# Patient Record
Sex: FEMALE | Race: BLACK OR AFRICAN AMERICAN | NOT HISPANIC OR LATINO | URBAN - METROPOLITAN AREA
[De-identification: names, ages, dates, MRNs, and addresses within clinical notes are randomized per-mention and may not be internally consistent; named-entity substitution may affect disease eponyms.]

---

## 2017-05-07 ENCOUNTER — INPATIENT (INPATIENT)
Facility: HOSPITAL | Age: 82
LOS: 0 days | Discharge: ANOTHER HOSPITAL | End: 2017-05-07
Attending: HOSPITALIST | Admitting: HOSPITALIST
Payer: MEDICARE

## 2017-05-07 VITALS
TEMPERATURE: 99 F | RESPIRATION RATE: 18 BRPM | DIASTOLIC BLOOD PRESSURE: 82 MMHG | SYSTOLIC BLOOD PRESSURE: 184 MMHG | OXYGEN SATURATION: 86 % | HEART RATE: 85 BPM

## 2017-05-07 DIAGNOSIS — Z96.652 PRESENCE OF LEFT ARTIFICIAL KNEE JOINT: Chronic | ICD-10-CM

## 2017-05-07 DIAGNOSIS — N17.9 ACUTE KIDNEY FAILURE, UNSPECIFIED: ICD-10-CM

## 2017-05-07 DIAGNOSIS — Z96.651 PRESENCE OF RIGHT ARTIFICIAL KNEE JOINT: Chronic | ICD-10-CM

## 2017-05-07 DIAGNOSIS — S72.92XA UNSPECIFIED FRACTURE OF LEFT FEMUR, INITIAL ENCOUNTER FOR CLOSED FRACTURE: ICD-10-CM

## 2017-05-07 DIAGNOSIS — F03.90 UNSPECIFIED DEMENTIA, UNSPECIFIED SEVERITY, WITHOUT BEHAVIORAL DISTURBANCE, PSYCHOTIC DISTURBANCE, MOOD DISTURBANCE, AND ANXIETY: ICD-10-CM

## 2017-05-07 DIAGNOSIS — Z29.9 ENCOUNTER FOR PROPHYLACTIC MEASURES, UNSPECIFIED: ICD-10-CM

## 2017-05-07 DIAGNOSIS — C50.919 MALIGNANT NEOPLASM OF UNSPECIFIED SITE OF UNSPECIFIED FEMALE BREAST: ICD-10-CM

## 2017-05-07 DIAGNOSIS — D64.9 ANEMIA, UNSPECIFIED: ICD-10-CM

## 2017-05-07 DIAGNOSIS — S72.442A DISPLACED FRACTURE OF LOWER EPIPHYSIS (SEPARATION) OF LEFT FEMUR, INITIAL ENCOUNTER FOR CLOSED FRACTURE: ICD-10-CM

## 2017-05-07 DIAGNOSIS — I10 ESSENTIAL (PRIMARY) HYPERTENSION: ICD-10-CM

## 2017-05-07 DIAGNOSIS — Z79.899 OTHER LONG TERM (CURRENT) DRUG THERAPY: ICD-10-CM

## 2017-05-07 DIAGNOSIS — E87.5 HYPERKALEMIA: ICD-10-CM
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
## 2017-05-07 LAB
ALBUMIN SERPL ELPH-MCNC: 3.9 G/DL — SIGNIFICANT CHANGE UP (ref 3.3–5)
ALP SERPL-CCNC: 52 U/L — SIGNIFICANT CHANGE UP (ref 40–120)
ALT FLD-CCNC: 14 U/L — SIGNIFICANT CHANGE UP (ref 4–33)
APPEARANCE UR: CLEAR — SIGNIFICANT CHANGE UP
AST SERPL-CCNC: 29 U/L — SIGNIFICANT CHANGE UP (ref 4–32)
BASE EXCESS BLDV CALC-SCNC: 4.6 MMOL/L — SIGNIFICANT CHANGE UP
BASOPHILS # BLD AUTO: 0.03 K/UL — SIGNIFICANT CHANGE UP (ref 0–0.2)
BASOPHILS NFR BLD AUTO: 0.3 % — SIGNIFICANT CHANGE UP (ref 0–2)
BILIRUB SERPL-MCNC: 0.4 MG/DL — SIGNIFICANT CHANGE UP (ref 0.2–1.2)
BILIRUB UR-MCNC: NEGATIVE — SIGNIFICANT CHANGE UP
BLOOD GAS VENOUS - CREATININE: 2.05 MG/DL — HIGH (ref 0.5–1.3)
BLOOD UR QL VISUAL: NEGATIVE — SIGNIFICANT CHANGE UP
BUN SERPL-MCNC: 34 MG/DL — HIGH (ref 7–23)
CALCIUM SERPL-MCNC: 10.1 MG/DL — SIGNIFICANT CHANGE UP (ref 8.4–10.5)
CHLORIDE BLDV-SCNC: 108 MMOL/L — SIGNIFICANT CHANGE UP (ref 96–108)
CHLORIDE SERPL-SCNC: 106 MMOL/L — SIGNIFICANT CHANGE UP (ref 98–107)
CK MB BLD-MCNC: 1.52 NG/ML — SIGNIFICANT CHANGE UP (ref 1–4.7)
CK MB BLD-MCNC: SIGNIFICANT CHANGE UP (ref 0–2.5)
CK SERPL-CCNC: 91 U/L — SIGNIFICANT CHANGE UP (ref 25–170)
CO2 SERPL-SCNC: 25 MMOL/L — SIGNIFICANT CHANGE UP (ref 22–31)
COLOR SPEC: YELLOW — SIGNIFICANT CHANGE UP
CREAT SERPL-MCNC: 2.02 MG/DL — HIGH (ref 0.5–1.3)
EOSINOPHIL # BLD AUTO: 0.1 K/UL — SIGNIFICANT CHANGE UP (ref 0–0.5)
EOSINOPHIL NFR BLD AUTO: 1.1 % — SIGNIFICANT CHANGE UP (ref 0–6)
GAS PNL BLDV: 137 MMOL/L — SIGNIFICANT CHANGE UP (ref 136–146)
GLUCOSE BLDV-MCNC: 84 — SIGNIFICANT CHANGE UP (ref 70–99)
GLUCOSE SERPL-MCNC: 97 MG/DL — SIGNIFICANT CHANGE UP (ref 70–99)
GLUCOSE UR-MCNC: NEGATIVE — SIGNIFICANT CHANGE UP
HCO3 BLDV-SCNC: 27 MMOL/L — SIGNIFICANT CHANGE UP (ref 20–27)
HCT VFR BLD CALC: 35.6 % — SIGNIFICANT CHANGE UP (ref 34.5–45)
HCT VFR BLDV CALC: 33.1 % — LOW (ref 34.5–45)
HGB BLD-MCNC: 10.8 G/DL — LOW (ref 11.5–15.5)
HGB BLDV-MCNC: 10.7 G/DL — LOW (ref 11.5–15.5)
IMM GRANULOCYTES NFR BLD AUTO: 0.3 % — SIGNIFICANT CHANGE UP (ref 0–1.5)
KETONES UR-MCNC: NEGATIVE — SIGNIFICANT CHANGE UP
LACTATE BLDV-MCNC: 3.5 MMOL/L — HIGH (ref 0.5–2)
LEUKOCYTE ESTERASE UR-ACNC: NEGATIVE — SIGNIFICANT CHANGE UP
LYMPHOCYTES # BLD AUTO: 1.65 K/UL — SIGNIFICANT CHANGE UP (ref 1–3.3)
LYMPHOCYTES # BLD AUTO: 17.7 % — SIGNIFICANT CHANGE UP (ref 13–44)
MCHC RBC-ENTMCNC: 30.3 % — LOW (ref 32–36)
MCHC RBC-ENTMCNC: 30.8 PG — SIGNIFICANT CHANGE UP (ref 27–34)
MCV RBC AUTO: 101.4 FL — HIGH (ref 80–100)
MONOCYTES # BLD AUTO: 0.46 K/UL — SIGNIFICANT CHANGE UP (ref 0–0.9)
MONOCYTES NFR BLD AUTO: 4.9 % — SIGNIFICANT CHANGE UP (ref 2–14)
NEUTROPHILS # BLD AUTO: 7.06 K/UL — SIGNIFICANT CHANGE UP (ref 1.8–7.4)
NEUTROPHILS NFR BLD AUTO: 75.7 % — SIGNIFICANT CHANGE UP (ref 43–77)
NITRITE UR-MCNC: NEGATIVE — SIGNIFICANT CHANGE UP
PCO2 BLDV: 59 MMHG — HIGH (ref 41–51)
PH BLDV: 7.33 PH — SIGNIFICANT CHANGE UP (ref 7.32–7.43)
PH UR: 7 — SIGNIFICANT CHANGE UP (ref 4.6–8)
PLATELET # BLD AUTO: 213 K/UL — SIGNIFICANT CHANGE UP (ref 150–400)
PMV BLD: 11.7 FL — SIGNIFICANT CHANGE UP (ref 7–13)
PO2 BLDV: 25 MMHG — LOW (ref 35–40)
POTASSIUM BLDV-SCNC: 5.2 MMOL/L — HIGH (ref 3.4–4.5)
POTASSIUM SERPL-MCNC: 6.3 MMOL/L — CRITICAL HIGH (ref 3.5–5.3)
POTASSIUM SERPL-SCNC: 6.3 MMOL/L — CRITICAL HIGH (ref 3.5–5.3)
PROT SERPL-MCNC: 7.8 G/DL — SIGNIFICANT CHANGE UP (ref 6–8.3)
PROT UR-MCNC: 10 — SIGNIFICANT CHANGE UP
RBC # BLD: 3.51 M/UL — LOW (ref 3.8–5.2)
RBC # FLD: 13.7 % — SIGNIFICANT CHANGE UP (ref 10.3–14.5)
RBC CASTS # UR COMP ASSIST: SIGNIFICANT CHANGE UP (ref 0–?)
SAO2 % BLDV: 37.3 % — LOW (ref 60–85)
SODIUM SERPL-SCNC: 146 MMOL/L — HIGH (ref 135–145)
SP GR SPEC: 1.02 — SIGNIFICANT CHANGE UP (ref 1–1.03)
SQUAMOUS # UR AUTO: SIGNIFICANT CHANGE UP
TROPONIN T SERPL-MCNC: < 0.06 NG/ML — SIGNIFICANT CHANGE UP (ref 0–0.06)
UROBILINOGEN FLD QL: NORMAL E.U. — SIGNIFICANT CHANGE UP (ref 0.1–0.2)
WBC # BLD: 9.33 K/UL — SIGNIFICANT CHANGE UP (ref 3.8–10.5)
WBC # FLD AUTO: 9.33 K/UL — SIGNIFICANT CHANGE UP (ref 3.8–10.5)
WBC UR QL: SIGNIFICANT CHANGE UP (ref 0–?)

## 2017-05-07 PROCEDURE — 73562 X-RAY EXAM OF KNEE 3: CPT | Mod: 26,LT

## 2017-05-07 PROCEDURE — 72125 CT NECK SPINE W/O DYE: CPT | Mod: 26

## 2017-05-07 PROCEDURE — 73552 X-RAY EXAM OF FEMUR 2/>: CPT | Mod: 26,LT

## 2017-05-07 PROCEDURE — 73700 CT LOWER EXTREMITY W/O DYE: CPT | Mod: 26,LT

## 2017-05-07 PROCEDURE — 70450 CT HEAD/BRAIN W/O DYE: CPT | Mod: 26

## 2017-05-07 PROCEDURE — 73503 X-RAY EXAM HIP UNI 4/> VIEWS: CPT | Mod: 26,LT

## 2017-05-07 PROCEDURE — 99223 1ST HOSP IP/OBS HIGH 75: CPT | Mod: GC

## 2017-05-07 PROCEDURE — 76376 3D RENDER W/INTRP POSTPROCES: CPT | Mod: 26

## 2017-05-07 PROCEDURE — 71010: CPT | Mod: 26

## 2017-05-07 RX ORDER — HEPARIN SODIUM 5000 [USP'U]/ML
5000 INJECTION INTRAVENOUS; SUBCUTANEOUS EVERY 8 HOURS
Qty: 0 | Refills: 0 | Status: COMPLETED | OUTPATIENT
Start: 2017-05-07 | End: 2017-05-07

## 2017-05-07 RX ORDER — DEXTROSE 50 % IN WATER 50 %
50 SYRINGE (ML) INTRAVENOUS ONCE
Qty: 0 | Refills: 0 | Status: COMPLETED | OUTPATIENT
Start: 2017-05-07 | End: 2017-05-07

## 2017-05-07 RX ORDER — FEBUXOSTAT 40 MG/1
1 TABLET ORAL
Qty: 0 | Refills: 0 | COMMUNITY

## 2017-05-07 RX ORDER — SODIUM POLYSTYRENE SULFONATE 4.1 MEQ/G
30 POWDER, FOR SUSPENSION ORAL ONCE
Qty: 0 | Refills: 0 | Status: DISCONTINUED | OUTPATIENT
Start: 2017-05-07 | End: 2017-05-07

## 2017-05-07 RX ORDER — HYDROMORPHONE HYDROCHLORIDE 2 MG/ML
1 INJECTION INTRAMUSCULAR; INTRAVENOUS; SUBCUTANEOUS ONCE
Qty: 0 | Refills: 0 | Status: DISCONTINUED | OUTPATIENT
Start: 2017-05-07 | End: 2017-05-07

## 2017-05-07 RX ORDER — KETOROLAC TROMETHAMINE 30 MG/ML
15 SYRINGE (ML) INJECTION ONCE
Qty: 0 | Refills: 0 | Status: DISCONTINUED | OUTPATIENT
Start: 2017-05-07 | End: 2017-05-07

## 2017-05-07 RX ORDER — INSULIN HUMAN 100 [IU]/ML
5 INJECTION, SOLUTION SUBCUTANEOUS ONCE
Qty: 0 | Refills: 0 | Status: COMPLETED | OUTPATIENT
Start: 2017-05-07 | End: 2017-05-07

## 2017-05-07 RX ORDER — CHOLESTYRAMINE 4 G/9G
0 POWDER, FOR SUSPENSION ORAL
Qty: 0 | Refills: 0 | COMMUNITY

## 2017-05-07 RX ORDER — SERTRALINE 25 MG/1
25 TABLET, FILM COATED ORAL DAILY
Qty: 0 | Refills: 0 | Status: DISCONTINUED | OUTPATIENT
Start: 2017-05-07 | End: 2017-05-07

## 2017-05-07 RX ORDER — HYDROMORPHONE HYDROCHLORIDE 2 MG/ML
0.5 INJECTION INTRAMUSCULAR; INTRAVENOUS; SUBCUTANEOUS EVERY 6 HOURS
Qty: 0 | Refills: 0 | Status: DISCONTINUED | OUTPATIENT
Start: 2017-05-07 | End: 2017-05-07

## 2017-05-07 RX ORDER — ANASTROZOLE 1 MG/1
1 TABLET ORAL
Qty: 0 | Refills: 0 | COMMUNITY

## 2017-05-07 RX ORDER — SODIUM CHLORIDE 9 MG/ML
1000 INJECTION INTRAMUSCULAR; INTRAVENOUS; SUBCUTANEOUS ONCE
Qty: 0 | Refills: 0 | Status: COMPLETED | OUTPATIENT
Start: 2017-05-07 | End: 2017-05-07

## 2017-05-07 RX ADMIN — HYDROMORPHONE HYDROCHLORIDE 1 MILLIGRAM(S): 2 INJECTION INTRAMUSCULAR; INTRAVENOUS; SUBCUTANEOUS at 18:40

## 2017-05-07 RX ADMIN — Medication 50 MILLILITER(S): at 21:11

## 2017-05-07 RX ADMIN — INSULIN HUMAN 5 UNIT(S): 100 INJECTION, SOLUTION SUBCUTANEOUS at 21:11

## 2017-05-07 RX ADMIN — HYDROMORPHONE HYDROCHLORIDE 1 MILLIGRAM(S): 2 INJECTION INTRAMUSCULAR; INTRAVENOUS; SUBCUTANEOUS at 14:03

## 2017-05-07 RX ADMIN — HEPARIN SODIUM 5000 UNIT(S): 5000 INJECTION INTRAVENOUS; SUBCUTANEOUS at 21:10

## 2017-05-07 RX ADMIN — HYDROMORPHONE HYDROCHLORIDE 1 MILLIGRAM(S): 2 INJECTION INTRAMUSCULAR; INTRAVENOUS; SUBCUTANEOUS at 14:18

## 2017-05-07 RX ADMIN — HYDROMORPHONE HYDROCHLORIDE 1 MILLIGRAM(S): 2 INJECTION INTRAMUSCULAR; INTRAVENOUS; SUBCUTANEOUS at 19:17

## 2017-05-07 RX ADMIN — Medication 15 MILLIGRAM(S): at 21:25

## 2017-05-07 RX ADMIN — SODIUM CHLORIDE 1000 MILLILITER(S): 9 INJECTION INTRAMUSCULAR; INTRAVENOUS; SUBCUTANEOUS at 17:24

## 2017-05-07 RX ADMIN — Medication 15 MILLIGRAM(S): at 21:10

## 2017-05-07 NOTE — DISCHARGE NOTE ADULT - CARE PLAN
Principal Discharge DX:	Closed displaced fracture of distal epiphysis of left femur, initial encounter  Goal:	Transfer to [facility]  Instructions for follow-up, activity and diet:	Transfer to [facility] for ORIF Principal Discharge DX:	Closed displaced fracture of distal epiphysis of left femur, initial encounter  Goal:	Transfer to [facility]  Instructions for follow-up, activity and diet:	Transfer to [facility] for ORIF Principal Discharge DX:	Closed displaced fracture of distal epiphysis of left femur, initial encounter  Goal:	Transfer to [facility]  Instructions for follow-up, activity and diet:	Transfer to [facility] for ORIF Principal Discharge DX:	Closed displaced fracture of distal epiphysis of left femur, initial encounter  Goal:	Transfer to [facility]  Instructions for follow-up, activity and diet:	Transfer to [facility] for ORIF

## 2017-05-07 NOTE — ED PROVIDER NOTE - PROGRESS NOTE DETAILS
Ortho paged. Awaiting callback/consult Ortho to repair femur fx. They request medicine admission given many medical comorbidities. Hospitalist paged for admission. Son left . He can be reached at [phone] and is [facility]. Ortho to repair femur fx. They request medicine admission given many medical comorbidities. Hospitalist paged for admission. Pt PMD not listed. Attempts made to reach son but he left hospital and is not answering phone.

## 2017-05-07 NOTE — H&P ADULT. - PROBLEM SELECTOR PLAN 4
- Pt slightly anemic  - Maybe in the setting of bleeding around fracture v chronic disease.   - Monitor H/H - Pt slightly anemic  - Likely multifactorial due to CKD stage 4 and possible hematoma formation 2/2 fracture  - Monitor H/H

## 2017-05-07 NOTE — H&P ADULT. - PROBLEM SELECTOR PLAN 6
- Pt on bumex, Uloric, and cholestyramine TID at home.   - Son unaware of why pt is on these medications.   - Suspect HLD and diastolic failure.   - TTE prior to surgery. - Pt on bumex, Uloric, and cholestyramine TID at home.   - Son unaware of why pt is on these medications.   - Suspect HLD and diastolic failure.   - consider TTE prior to surgery. - C/w home medication Namenda.  - Aspiration precautions

## 2017-05-07 NOTE — H&P ADULT. - COMMENTS
Could not obtain ROS as pt could not answer questions likely related to underlying dementia. Could not obtain ROS as pt could not answer questions 2/2 underlying dementia.

## 2017-05-07 NOTE — ED PROVIDER NOTE - OBJECTIVE STATEMENT
89yF 89yF hx dementia (baseline aaox2), htn, hld bl knee replacements p/w left leg pain after fall. Pt son at bedside. He explains that pt was in bed and he heard a thump, found pt awake on ground with leg internally rotated and pt not moving leg. Pt has not moved left leg since fall and has been moaning in severe pain. No recent illness or fever. When asked where pain is pt denotes pain everywhere in her body but is most tender at left thigh.

## 2017-05-07 NOTE — DISCHARGE NOTE ADULT - FINDINGS/TREATMENT
Please refer to H&P from [facility] dated 5/72017 regarding initial assessment and plan at [facility].

## 2017-05-07 NOTE — H&P ADULT. - RS GEN PE MLT RESP DETAILS PC
respirations non-labored/good air movement/breath sounds equal no wheezes/breath sounds equal/no rales/respirations non-labored/no rhonchi/good air movement

## 2017-05-07 NOTE — H&P ADULT. - PROBLEM SELECTOR PLAN 5
- SHAKA v worsening CKD.   - Monitor Cr. avoid nephrotoxins. - SHAKA v worsening CKD c/b mild hyperkalemia   - Monitor Cr. avoid nephrotoxins.

## 2017-05-07 NOTE — H&P ADULT. - PROBLEM SELECTOR PLAN 2
- Pts blood pressure elevated in the setting of pain.   - She was on ACEi at home, however, given elevation in Cr from previous value will hold until baseline Cr can be confirmed or pts Cr trends down. - Pts blood pressure elevated in the setting of pain  - Pain control in the setting of CKD 4  - She was on ACEi at home, however, given elevation in Cr from previous value will hold until baseline Cr can be confirmed or pts Cr trends down. - Pt likely had mechanical fall, however, will monitor on Tele r/o syncope given unwitnessed fall   -Admitted to Telemetry  -1st set of CE negative, EKG no acute changes   - Would recommend Cardiology c/s and TTE after transfer to [facility] prior to OR

## 2017-05-07 NOTE — H&P ADULT. - PROBLEM SELECTOR PLAN 1
- Ortho plan for transfer to [facility] for ORIF for the distal femur  - Monitor for bleeding, edema, and DVT  - Post op PT and possible rehab.   - Pain control with IV Dilaudid. Due to a fall;  - Ortho plan for transfer to [facility] for ORIF for the distal femur  - Monitor for bleeding, edema  - DVT ppx  - Post op PT and possible rehab.   - Pain control with IV Dilaudid. Due to a fall;  - Ortho plan for transfer to [facility] for ORIF for the distal femur  - Monitor for bleeding, edema  - DVT ppx  - Post op PT and possible rehab.   - Pain control with IV Dilaudid in the setting or CKD stage 4

## 2017-05-07 NOTE — H&P ADULT. - PROBLEM SELECTOR PLAN 8
- DVT ppx with Heparin sq and right left SCD pt high risk for DVT - Pts son reports breast CA and on oral medication for this   - would contact pts home oncologist to confirm. - pt given d50 and 5 units of IV insulin in the setting of renal dysfunction.   - repeat K on arrival to [facility].   - Given CKD 4 would consider renal c/s - Pts blood pressure elevated in the setting of pain  - Pain control in the setting of CKD 4  - She was on ACEi at home, however, given elevation in Cr from previous value will hold until baseline Cr can be confirmed or pts Cr trends down.

## 2017-05-07 NOTE — H&P ADULT. - PROBLEM SELECTOR PLAN 7
- DVT ppx with Heparin sq and right left SCD pt high risk for DVT - pt given d50 and 5 units of IV insulin in the setting of renal dysfunction.   - repeat K on arrival to [facility].   - Given CKD 4 would consider renal c/s - Pt likely had mechanical fall, however, will monitor of tele for now although low suspicion for cardiac disease.   - would consider TTE after transfer to [facility]. - Pt on bumex, Uloric, and cholestyramine TID at home.   - Son unaware of why pt is on these medications.   - Suspect HLD and diastolic failure.

## 2017-05-07 NOTE — ED ADULT NURSE NOTE - OBJECTIVE STATEMENT
Alert and oriented x 1. Pt is disoriented to time, place and situation. As per son she fell today and he found her on the floor and called EMS. Pt has dementia. Pt is poor historian and focusing on left hip . As per son she usually walks at home . Pt left leg is internally rotated. Pt is rubbing leg and complaining of pain. IV 22g to right hand. Labs drawn. VSS except  BP. MD aware . Pt didn't take BP meds according to son. Sinus rhythm on cardiac monitor. Will continue to monitor.

## 2017-05-07 NOTE — DISCHARGE NOTE ADULT - OTHER SIGNIFICANT FINDINGS
Please refer to H&P from [facility] dated 5/72017 regarding initial assessment and plan at [facility].

## 2017-05-07 NOTE — DISCHARGE NOTE ADULT - HOSPITAL COURSE
Pt is a 89 year old F with dementia baseline AAOx0-1 (ambulatory at baseline), HTN, HLD, and breast CA (pt on anastrozole - stable - no plans for further treatment) presents to the [facility] ED after a unwitnessed fall. She was in bed, and her son heard a thump. He went into the patients room and found her on the floor next to her bed. She was away, but in pain. She was unable to move her left leg after the fall and was moaning in pain so the pt was bought in. On interview the patient is awake, however, she is not answering any questions. She is mostly moaning and has her right hand over her left leg.     In the ED her vitals were: 98.0, 88, 210/78, 20, 100%RA. She received Dilaudid 1 mg x1 and 1L NS. The patient had a CT head and neck which was reviewed and was negative for any bleed or fracture. The patient also had a Xray of the left left which was noted to be internally rotated and she was found to have a distal femur fracture. Ortho was called. Pt was admitted to medicine service as syncope could not be ruled out.     Pt was monitored on tele. Review only showed few APCs and sinus. Ortho was called and pt will be transferred to [facility] for further management of fracture. Pt is a 89 year old F with dementia baseline AAOx0-1 (ambulatory at baseline), HTN, HLD, and breast CA (pt on anastrozole - stable - no plans for further treatment) presents to the [facility] ED after a unwitnessed fall. She was in bed, and her son heard a thump. He went into the patients room and found her on the floor next to her bed. She was away, but in pain. She was unable to move her left leg after the fall and was moaning in pain so the pt was bought in. On interview the patient is awake, however, she is not answering any questions. She is mostly moaning and has her right hand over her left leg.     In the ED her vitals were: 98.0, 88, 210/78, 20, 100%RA. She received Dilaudid 1 mg x1 and 1L NS. The patient had a CT head and neck which was reviewed and was negative for any bleed or fracture. The patient also had a Xray of the left left which was noted to be internally rotated and she was found to have a distal femur fracture. Ortho was called. Pt was admitted to medicine service as syncope could not be ruled out.     Pt was monitored on tele. Review only showed few APCs and sinus. Ortho was called and pt will be transferred to [facility] for further management of fracture. Pt was noted to have hyperkalemia. She was given insulin, d50, and 1L of IVf. Specimen was noted to be hemolyzed.

## 2017-05-07 NOTE — ED PROVIDER NOTE - ATTENDING CONTRIBUTION TO CARE
I performed a history and physical exam of the patient and discussed their management with the resident. I reviewed the resident's note and agree with the documented findings and plan of care. My medical decision making and observations are found above.  Lungs clear. lt leg swollen pain in hip and knee.

## 2017-05-07 NOTE — DISCHARGE NOTE ADULT - MEDICATION SUMMARY - MEDICATIONS TO TAKE
I will START or STAY ON the medications listed below when I get home from the hospital:    Benicar 20 mg oral tablet  -- 1 tab(s) by mouth once a day  -- Indication: For HTN    Zoloft 25 mg oral tablet  -- 1 tab(s) by mouth once a day  -- Indication: For Dementia    cholestyramine 4 g/5 g oral powder for reconstitution  --  by mouth 3 times a day  -- Indication: For HLD    Uloric 40 mg oral tablet  -- 1 tab(s) by mouth once a day  -- Indication: For Uric acid    anastrozole 1 mg oral tablet  -- 1 tab(s) by mouth once a day  -- Indication: For Breast CA    bumetanide 1 mg oral tablet  -- 1 tab(s) by mouth once a day  -- Indication: For Diuretic    Namenda 10 mg oral tablet  -- 1 tab(s) by mouth 2 times a day  -- Indication: For Dementia    multivitamin  -- 1 tab(s)  once a day  -- Indication: For Supplement

## 2017-05-07 NOTE — ED ADULT TRIAGE NOTE - CHIEF COMPLAINT QUOTE
Pt brought from home after tripping coming out of bedroom.  Denies head injury, denies LOC.  Left leg appears shortened and internally rotated   HX: dementia, as per son pt "rarely speaks"

## 2017-05-07 NOTE — H&P ADULT. - NEUROLOGICAL DETAILS
alert and oriented x 3/responds to pain/responds to verbal commands responds to pain/responds to verbal commands

## 2017-05-07 NOTE — DISCHARGE NOTE ADULT - CARE PROVIDER_API CALL
Krys Yuen), Internal Medicine; Pediatrics  [address] [place] [address]  Phone: [phone]  Fax: [phone]

## 2017-05-07 NOTE — H&P ADULT. - RADIOLOGY RESULTS AND INTERPRETATION
CT head negative for bleed.  CT neck negtive for fracture.   Xray L leg: distal femur fracture.   CT L leg distal femur fracture.

## 2017-05-07 NOTE — H&P ADULT. - PROBLEM SELECTOR PLAN 9
- DVT ppx with Heparin sq and right left SCD pt high risk for DVT - Pts son reports breast CA and on oral medication for this   - would contact pts home oncologist to confirm.

## 2017-05-07 NOTE — ED PROVIDER NOTE - MEDICAL DECISION MAKING DETAILS
Elpidio: Pt with severe dementia had a fall with lt sided pain and swelling.  Patient also complaining of chest pain.  Fall unwitnessed.

## 2017-05-07 NOTE — H&P ADULT. - EKG AND INTERPRETATION
NSR with PACs NSR, 74bpm, qtc 466, no acute Tw or ST changes, occasional PAC - my reading, grossly unchanged compared to EKG from 12/14/2013

## 2017-05-07 NOTE — H&P ADULT. - PROBLEM SELECTOR PLAN 3
- C/w home medication Namenda. - C/w home medication Namenda.  - Aspiration precautions - pt given d50 and 5 units of IV insulin in the setting of renal dysfunction.   - repeat serum potassium on arrival to [facility].   - Given CKD 4 would Consider renal c/s

## 2017-05-07 NOTE — H&P ADULT. - ASSESSMENT
89 year old F with dementia baseline AAOx0-1, HTN, HLD, and ?breast CA (pt on anastrozole, however, unclear why could not get in touch with patinets son). She presents to the [facility] ED after a unwitnessed fall. Admitted after she was found to have a distal femur fracture. 89 year old Female with dementia baseline AAOx0-1, HTN, HLD, and ?breast CA a/w a fall c/b Left distal femur fracture and mild hyperkalemia with no acute EKG changes; 89 year old Female with dementia baseline AAOx0-1, HTN, HLD, and ?breast CA a/w a fall c/b Left distal femur fracture and mild hyperkalemia with no acute EKG changes; Unwitnessed fall, cannot r/o syncope; 89 year old Female with dementia baseline AAOx0-1, HTN, HLD, and ?breast CA a/w a fall c/b Left distal femur fracture and mild hyperkalemia with no acute EKG changes; Unwitnessed fall, cannot r/o syncope; Mild hyperkalemia;

## 2017-05-07 NOTE — DISCHARGE NOTE ADULT - PATIENT PORTAL LINK FT
“You can access the FollowHealth Patient Portal, offered by [facility], by registering with the following website: [email or web address]”

## 2017-05-07 NOTE — H&P ADULT. - HISTORY OF PRESENT ILLNESS
Pt is a 89 year old F with dementia baseline AAOx0-1 (ambulatory at baseline), HTN, HLD, and breast CA (pt on anastrozole - stable - no plans for further treatment) presents to the [facility] ED after a unwitnessed fall. She was in bed, and her son heard a thump. He went into the patients room and found her on the floor next to her bed. She was away, but in pain. She was unable to move her left leg after the fall and was moaning in pain so the pt was bought in. On interview the patient is awake, however, she is not answering any questions. She is mostly moaning and has her right hand over her left leg.     In the ED her vitals were: 98.0, 88, 210/78, 20, 100%RA. She received Dilaudid 1 mg x1 and 1L NS. The patient had a CT head and neck which was reviewed and was negative for any bleed or fracture. The patient also had a Xray of the left left which was noted to be internally rotated and she was found to have a distal femur fracture. Ortho was called. Pt was admitted to medicine service as syncope could not be ruled out. Pt is a 89 year old Female, with dementia baseline AAOx0-1 (ambulatory with out a walker at baseline per the son), HTN, HLD, and breast CA (pt on anastrozole - stable - no plans for further treatment) presents to the [facility] ED after a fall. She was in bed, and her son heard a thump. He went into the patients room and found her on the floor next to her bed. She was away, but in pain. She was unable to move her left leg after the fall and was moaning in pain so the pt was bought in. On interview the patient is awake, however, she is not answering any questions. She is mostly moaning and has her right hand over her left leg.     In the ED her vitals were: 98.0, 88, 210/78, 20, 100%RA. She received Dilaudid 1 mg x1 and 1L NS. The patient had a CT head and neck which was reviewed and was negative for any bleed or fracture. The patient also had a Xray of the left left which was noted to be internally rotated and she was found to have a distal femur fracture. Ortho was called. Pt was admitted to medicine service as syncope could not be ruled out. Pt is a 89 year old Female, with dementia baseline AAOx0-1 (ambulatory with out a walker at baseline per the son), HTN, HLD, and breast CA (pt on anastrozole - stable - no plans for further treatment) presents to the [facility] ED after a fall. She was in bed, and her son heard a thump. He went into the patients room and found her on the floor next to her bed. She was away, but in pain. She was unable to move her left leg after the fall and was moaning in pain so the pt was bought in. On interview the patient is awake, however, she is not answering any questions. She is mostly moaning and has her right hand over her left leg.     ED course: 98.0, 88, 210/78, 20, 100%RA. She received Dilaudid 1 mg x1 and 1L NS. The patient had a CT head and neck which was reviewed and was negative for any bleed or fracture. The patient also had a Xray of the left left which was noted to be internally rotated and she was found to have a distal femur fracture. Ortho c/s was called. Pt was admitted to medicine service as syncope could not be ruled out.     Of note, nail polish was removed from the Rt finger to obtain  accurate O2 sat.

## 2017-05-07 NOTE — H&P ADULT. - PSH
History of knee replacement procedure of left knee    History of knee replacement procedure of right knee

## 2017-05-08 ENCOUNTER — TRANSCRIPTION ENCOUNTER (OUTPATIENT)
Age: 82
End: 2017-05-08

## 2017-05-08 ENCOUNTER — INPATIENT (INPATIENT)
Facility: HOSPITAL | Age: 82
LOS: 6 days | Discharge: HOME | DRG: 481 | End: 2017-05-15
Attending: ORTHOPAEDIC SURGERY | Admitting: ORTHOPAEDIC SURGERY
Payer: MEDICARE

## 2017-05-08 VITALS
TEMPERATURE: 98 F | RESPIRATION RATE: 16 BRPM | OXYGEN SATURATION: 100 % | DIASTOLIC BLOOD PRESSURE: 88 MMHG | HEART RATE: 89 BPM | SYSTOLIC BLOOD PRESSURE: 137 MMHG

## 2017-05-08 VITALS
DIASTOLIC BLOOD PRESSURE: 82 MMHG | OXYGEN SATURATION: 100 % | SYSTOLIC BLOOD PRESSURE: 129 MMHG | WEIGHT: 240.74 LBS | HEART RATE: 91 BPM | RESPIRATION RATE: 26 BRPM | HEIGHT: 66 IN | TEMPERATURE: 98 F

## 2017-05-08 DIAGNOSIS — Z96.652 PRESENCE OF LEFT ARTIFICIAL KNEE JOINT: Chronic | ICD-10-CM

## 2017-05-08 DIAGNOSIS — M89.155: ICD-10-CM

## 2017-05-08 DIAGNOSIS — S72 FRACTURE OF FEMUR: ICD-10-CM

## 2017-05-08 DIAGNOSIS — Z96.651 PRESENCE OF RIGHT ARTIFICIAL KNEE JOINT: Chronic | ICD-10-CM

## 2017-05-08 LAB
ALBUMIN SERPL ELPH-MCNC: 3.7 G/DL — SIGNIFICANT CHANGE UP (ref 3.3–5)
ALP SERPL-CCNC: 44 U/L — SIGNIFICANT CHANGE UP (ref 40–120)
ALT FLD-CCNC: 15 U/L RC — SIGNIFICANT CHANGE UP (ref 10–45)
ANION GAP SERPL CALC-SCNC: 12 MMOL/L — SIGNIFICANT CHANGE UP (ref 5–17)
ANION GAP SERPL CALC-SCNC: 14 MMOL/L — SIGNIFICANT CHANGE UP (ref 5–17)
ANION GAP SERPL CALC-SCNC: 15 MMOL/L — SIGNIFICANT CHANGE UP (ref 5–17)
APTT BLD: 23.1 SEC — LOW (ref 27.5–37.4)
AST SERPL-CCNC: 22 U/L — SIGNIFICANT CHANGE UP (ref 10–40)
BASOPHILS # BLD AUTO: 0 K/UL — SIGNIFICANT CHANGE UP (ref 0–0.2)
BASOPHILS NFR BLD AUTO: 0.2 % — SIGNIFICANT CHANGE UP (ref 0–2)
BILIRUB SERPL-MCNC: 0.6 MG/DL — SIGNIFICANT CHANGE UP (ref 0.2–1.2)
BLD GP AB SCN SERPL QL: NEGATIVE — SIGNIFICANT CHANGE UP
BUN SERPL-MCNC: 29 MG/DL — HIGH (ref 7–23)
BUN SERPL-MCNC: 36 MG/DL — HIGH (ref 7–23)
BUN SERPL-MCNC: 38 MG/DL — HIGH (ref 7–23)
CA-I BLD-SCNC: 1.22 MMOL/L — SIGNIFICANT CHANGE UP (ref 1.05–1.34)
CALCIUM SERPL-MCNC: 6.5 MG/DL — CRITICAL LOW (ref 8.4–10.5)
CALCIUM SERPL-MCNC: 8.8 MG/DL — SIGNIFICANT CHANGE UP (ref 8.4–10.5)
CALCIUM SERPL-MCNC: 9.2 MG/DL — SIGNIFICANT CHANGE UP (ref 8.4–10.5)
CHLORIDE SERPL-SCNC: 105 MMOL/L — SIGNIFICANT CHANGE UP (ref 96–108)
CHLORIDE SERPL-SCNC: 107 MMOL/L — SIGNIFICANT CHANGE UP (ref 96–108)
CHLORIDE SERPL-SCNC: 76 MMOL/L — LOW (ref 96–108)
CO2 SERPL-SCNC: 18 MMOL/L — LOW (ref 22–31)
CO2 SERPL-SCNC: 22 MMOL/L — SIGNIFICANT CHANGE UP (ref 22–31)
CO2 SERPL-SCNC: 22 MMOL/L — SIGNIFICANT CHANGE UP (ref 22–31)
CREAT SERPL-MCNC: 1.58 MG/DL — HIGH (ref 0.5–1.3)
CREAT SERPL-MCNC: 2.06 MG/DL — HIGH (ref 0.5–1.3)
CREAT SERPL-MCNC: 2.3 MG/DL — HIGH (ref 0.5–1.3)
EOSINOPHIL # BLD AUTO: 0 K/UL — SIGNIFICANT CHANGE UP (ref 0–0.5)
EOSINOPHIL NFR BLD AUTO: 0.4 % — SIGNIFICANT CHANGE UP (ref 0–6)
GAS PNL BLDA: SIGNIFICANT CHANGE UP
GLUCOSE SERPL-MCNC: 122 MG/DL — HIGH (ref 70–99)
GLUCOSE SERPL-MCNC: 127 MG/DL — HIGH (ref 70–99)
GLUCOSE SERPL-MCNC: 90 MG/DL — SIGNIFICANT CHANGE UP (ref 70–99)
HCT VFR BLD CALC: 22 % — LOW (ref 34.5–45)
HCT VFR BLD CALC: 26.7 % — LOW (ref 34.5–45)
HCT VFR BLD CALC: 28.8 % — LOW (ref 34.5–45)
HCT VFR BLD CALC: 29.3 % — LOW (ref 34.5–45)
HGB BLD-MCNC: 7.1 G/DL — LOW (ref 11.5–15.5)
HGB BLD-MCNC: 8.7 G/DL — LOW (ref 11.5–15.5)
HGB BLD-MCNC: 9.4 G/DL — LOW (ref 11.5–15.5)
HGB BLD-MCNC: 9.7 G/DL — LOW (ref 11.5–15.5)
INR BLD: 1.03 RATIO — SIGNIFICANT CHANGE UP (ref 0.88–1.16)
LYMPHOCYTES # BLD AUTO: 1.3 K/UL — SIGNIFICANT CHANGE UP (ref 1–3.3)
LYMPHOCYTES # BLD AUTO: 11.8 % — LOW (ref 13–44)
MAGNESIUM SERPL-MCNC: 1.5 MG/DL — LOW (ref 1.6–2.6)
MAGNESIUM SERPL-MCNC: 2 MG/DL — SIGNIFICANT CHANGE UP (ref 1.6–2.6)
MAGNESIUM SERPL-MCNC: 2.1 MG/DL — SIGNIFICANT CHANGE UP (ref 1.6–2.6)
MCHC RBC-ENTMCNC: 31.8 PG — SIGNIFICANT CHANGE UP (ref 27–34)
MCHC RBC-ENTMCNC: 32.1 PG — SIGNIFICANT CHANGE UP (ref 27–34)
MCHC RBC-ENTMCNC: 32.4 PG — SIGNIFICANT CHANGE UP (ref 27–34)
MCHC RBC-ENTMCNC: 32.5 GM/DL — SIGNIFICANT CHANGE UP (ref 32–36)
MCHC RBC-ENTMCNC: 32.5 GM/DL — SIGNIFICANT CHANGE UP (ref 32–36)
MCHC RBC-ENTMCNC: 32.6 GM/DL — SIGNIFICANT CHANGE UP (ref 32–36)
MCHC RBC-ENTMCNC: 33.2 GM/DL — SIGNIFICANT CHANGE UP (ref 32–36)
MCHC RBC-ENTMCNC: 33.3 PG — SIGNIFICANT CHANGE UP (ref 27–34)
MCV RBC AUTO: 100 FL — SIGNIFICANT CHANGE UP (ref 80–100)
MCV RBC AUTO: 97.8 FL — SIGNIFICANT CHANGE UP (ref 80–100)
MCV RBC AUTO: 99 FL — SIGNIFICANT CHANGE UP (ref 80–100)
MCV RBC AUTO: 99.3 FL — SIGNIFICANT CHANGE UP (ref 80–100)
MONOCYTES # BLD AUTO: 0.9 K/UL — SIGNIFICANT CHANGE UP (ref 0–0.9)
MONOCYTES NFR BLD AUTO: 8.1 % — SIGNIFICANT CHANGE UP (ref 2–14)
NEUTROPHILS # BLD AUTO: 9 K/UL — HIGH (ref 1.8–7.4)
NEUTROPHILS NFR BLD AUTO: 79.4 % — HIGH (ref 43–77)
PHOSPHATE SERPL-MCNC: 3.3 MG/DL — SIGNIFICANT CHANGE UP (ref 2.5–4.5)
PHOSPHATE SERPL-MCNC: 3.4 MG/DL — SIGNIFICANT CHANGE UP (ref 2.5–4.5)
PHOSPHATE SERPL-MCNC: 4.2 MG/DL — SIGNIFICANT CHANGE UP (ref 2.5–4.5)
PLATELET # BLD AUTO: 103 K/UL — LOW (ref 150–400)
PLATELET # BLD AUTO: 142 K/UL — LOW (ref 150–400)
PLATELET # BLD AUTO: 160 K/UL — SIGNIFICANT CHANGE UP (ref 150–400)
PLATELET # BLD AUTO: 178 K/UL — SIGNIFICANT CHANGE UP (ref 150–400)
POTASSIUM SERPL-MCNC: 3.8 MMOL/L — SIGNIFICANT CHANGE UP (ref 3.5–5.3)
POTASSIUM SERPL-MCNC: 4.9 MMOL/L — SIGNIFICANT CHANGE UP (ref 3.5–5.3)
POTASSIUM SERPL-MCNC: 5 MMOL/L — SIGNIFICANT CHANGE UP (ref 3.5–5.3)
POTASSIUM SERPL-SCNC: 3.8 MMOL/L — SIGNIFICANT CHANGE UP (ref 3.5–5.3)
POTASSIUM SERPL-SCNC: 4.9 MMOL/L — SIGNIFICANT CHANGE UP (ref 3.5–5.3)
POTASSIUM SERPL-SCNC: 5 MMOL/L — SIGNIFICANT CHANGE UP (ref 3.5–5.3)
PROT SERPL-MCNC: 6.6 G/DL — SIGNIFICANT CHANGE UP (ref 6–8.3)
PROTHROM AB SERPL-ACNC: 11.1 SEC — SIGNIFICANT CHANGE UP (ref 9.8–12.7)
RBC # BLD: 2.22 M/UL — LOW (ref 3.8–5.2)
RBC # BLD: 2.73 M/UL — LOW (ref 3.8–5.2)
RBC # BLD: 2.9 M/UL — LOW (ref 3.8–5.2)
RBC # BLD: 2.92 M/UL — LOW (ref 3.8–5.2)
RBC # FLD: 12.1 % — SIGNIFICANT CHANGE UP (ref 10.3–14.5)
RBC # FLD: 12.4 % — SIGNIFICANT CHANGE UP (ref 10.3–14.5)
RBC # FLD: 13.5 % — SIGNIFICANT CHANGE UP (ref 10.3–14.5)
RBC # FLD: 13.7 % — SIGNIFICANT CHANGE UP (ref 10.3–14.5)
RH IG SCN BLD-IMP: POSITIVE — SIGNIFICANT CHANGE UP
SODIUM SERPL-SCNC: 106 MMOL/L — CRITICAL LOW (ref 135–145)
SODIUM SERPL-SCNC: 142 MMOL/L — SIGNIFICANT CHANGE UP (ref 135–145)
SODIUM SERPL-SCNC: 143 MMOL/L — SIGNIFICANT CHANGE UP (ref 135–145)
WBC # BLD: 10.9 K/UL — HIGH (ref 3.8–10.5)
WBC # BLD: 11.3 K/UL — HIGH (ref 3.8–10.5)
WBC # BLD: 11.6 K/UL — HIGH (ref 3.8–10.5)
WBC # BLD: 21.6 K/UL — HIGH (ref 3.8–10.5)
WBC # FLD AUTO: 10.9 K/UL — HIGH (ref 3.8–10.5)
WBC # FLD AUTO: 11.3 K/UL — HIGH (ref 3.8–10.5)
WBC # FLD AUTO: 11.6 K/UL — HIGH (ref 3.8–10.5)
WBC # FLD AUTO: 21.6 K/UL — HIGH (ref 3.8–10.5)

## 2017-05-08 PROCEDURE — 99221 1ST HOSP IP/OBS SF/LOW 40: CPT

## 2017-05-08 PROCEDURE — 93306 TTE W/DOPPLER COMPLETE: CPT | Mod: 26

## 2017-05-08 PROCEDURE — 93010 ELECTROCARDIOGRAM REPORT: CPT

## 2017-05-08 PROCEDURE — 27511 TREATMENT OF THIGH FRACTURE: CPT | Mod: LT

## 2017-05-08 PROCEDURE — 93880 EXTRACRANIAL BILAT STUDY: CPT | Mod: 26

## 2017-05-08 RX ORDER — ACETAMINOPHEN 500 MG
1000 TABLET ORAL ONCE
Qty: 0 | Refills: 0 | Status: COMPLETED | OUTPATIENT
Start: 2017-05-08 | End: 2017-05-08

## 2017-05-08 RX ORDER — ENOXAPARIN SODIUM 100 MG/ML
40 INJECTION SUBCUTANEOUS DAILY
Qty: 0 | Refills: 0 | Status: DISCONTINUED | OUTPATIENT
Start: 2017-05-09 | End: 2017-05-15

## 2017-05-08 RX ORDER — MEMANTINE HYDROCHLORIDE 10 MG/1
10 TABLET ORAL
Qty: 0 | Refills: 0 | Status: DISCONTINUED | OUTPATIENT
Start: 2017-05-08 | End: 2017-05-08

## 2017-05-08 RX ORDER — MEMANTINE HYDROCHLORIDE 10 MG/1
10 TABLET ORAL EVERY 12 HOURS
Qty: 0 | Refills: 0 | Status: DISCONTINUED | OUTPATIENT
Start: 2017-05-09 | End: 2017-05-15

## 2017-05-08 RX ORDER — HYDROMORPHONE HYDROCHLORIDE 2 MG/ML
0.5 INJECTION INTRAMUSCULAR; INTRAVENOUS; SUBCUTANEOUS ONCE
Qty: 0 | Refills: 0 | Status: DISCONTINUED | OUTPATIENT
Start: 2017-05-08 | End: 2017-05-08

## 2017-05-08 RX ORDER — SODIUM CHLORIDE 9 MG/ML
500 INJECTION, SOLUTION INTRAVENOUS ONCE
Qty: 0 | Refills: 0 | Status: COMPLETED | OUTPATIENT
Start: 2017-05-08 | End: 2017-05-08

## 2017-05-08 RX ORDER — LABETALOL HCL 100 MG
10 TABLET ORAL ONCE
Qty: 0 | Refills: 0 | Status: COMPLETED | OUTPATIENT
Start: 2017-05-08 | End: 2017-05-08

## 2017-05-08 RX ORDER — SODIUM CHLORIDE 9 MG/ML
1000 INJECTION INTRAMUSCULAR; INTRAVENOUS; SUBCUTANEOUS
Qty: 0 | Refills: 0 | Status: DISCONTINUED | OUTPATIENT
Start: 2017-05-08 | End: 2017-05-08

## 2017-05-08 RX ORDER — HYDROMORPHONE HYDROCHLORIDE 2 MG/ML
0.5 INJECTION INTRAMUSCULAR; INTRAVENOUS; SUBCUTANEOUS EVERY 4 HOURS
Qty: 0 | Refills: 0 | Status: DISCONTINUED | OUTPATIENT
Start: 2017-05-08 | End: 2017-05-09

## 2017-05-08 RX ADMIN — SODIUM CHLORIDE 1000 MILLILITER(S): 9 INJECTION, SOLUTION INTRAVENOUS at 14:18

## 2017-05-08 RX ADMIN — Medication 10 MILLIGRAM(S): at 19:54

## 2017-05-08 RX ADMIN — Medication 400 MILLIGRAM(S): at 12:36

## 2017-05-08 RX ADMIN — Medication 1000 MILLIGRAM(S): at 20:36

## 2017-05-08 RX ADMIN — Medication 400 MILLIGRAM(S): at 20:06

## 2017-05-08 RX ADMIN — MEMANTINE HYDROCHLORIDE 10 MILLIGRAM(S): 10 TABLET ORAL at 12:34

## 2017-05-08 RX ADMIN — SODIUM CHLORIDE 75 MILLILITER(S): 9 INJECTION INTRAMUSCULAR; INTRAVENOUS; SUBCUTANEOUS at 05:11

## 2017-05-08 RX ADMIN — HYDROMORPHONE HYDROCHLORIDE 0.5 MILLIGRAM(S): 2 INJECTION INTRAMUSCULAR; INTRAVENOUS; SUBCUTANEOUS at 06:50

## 2017-05-08 RX ADMIN — HYDROMORPHONE HYDROCHLORIDE 0.5 MILLIGRAM(S): 2 INJECTION INTRAMUSCULAR; INTRAVENOUS; SUBCUTANEOUS at 06:35

## 2017-05-08 RX ADMIN — Medication 1000 MILLIGRAM(S): at 04:45

## 2017-05-08 RX ADMIN — Medication 1000 MILLIGRAM(S): at 13:30

## 2017-05-08 RX ADMIN — Medication 400 MILLIGRAM(S): at 04:30

## 2017-05-08 NOTE — H&P ADULT. - ASSESSMENT
89F wit left distal femur periprosthetic fracture  -pain control  -NPO except meds  -IVF  -hold all anticoagulation  -needs medical clearance  -pt to OR today for ORIF with Dr. Merrill

## 2017-05-08 NOTE — H&P ADULT. - HISTORY OF PRESENT ILLNESS
Pt is a 89 year old Female, with dementia baseline AAOx0-1 (ambulatory with out a walker at baseline per the son), HTN, HLD, and breast CA (pt on anastrozole - stable - no plans for further treatment) presents to the [facility] after transfer from [facility].    Pt was in bed, and her son heard a thump. He went into the patients room and found her on the floor next to her bed. Pt was complaining of left knee pain. Pt found to have a left distal femur periprosthetic fracture. Poor historian due to dementia    Currently pt denies numbness tingling paresthesias and has no other orthopedic complaints at this time.

## 2017-05-09 LAB
APTT BLD: 22.3 SEC — LOW (ref 27.5–37.4)
BUN SERPL-MCNC: 38 MG/DL — HIGH (ref 7–23)
CALCIUM SERPL-MCNC: 9 MG/DL — SIGNIFICANT CHANGE UP (ref 8.4–10.5)
CHLORIDE SERPL-SCNC: 108 MMOL/L — SIGNIFICANT CHANGE UP (ref 96–108)
CO2 SERPL-SCNC: 23 MMOL/L — SIGNIFICANT CHANGE UP (ref 22–31)
CREAT SERPL-MCNC: 2.17 MG/DL — HIGH (ref 0.5–1.3)
GLUCOSE SERPL-MCNC: 121 MG/DL — HIGH (ref 70–99)
HCT VFR BLD CALC: 28.5 % — LOW (ref 34.5–45)
HGB BLD-MCNC: 9.5 G/DL — LOW (ref 11.5–15.5)
INR BLD: 1.1 RATIO — SIGNIFICANT CHANGE UP (ref 0.88–1.16)
MAGNESIUM SERPL-MCNC: 2.1 MG/DL — SIGNIFICANT CHANGE UP (ref 1.6–2.6)
MCHC RBC-ENTMCNC: 32.2 PG — SIGNIFICANT CHANGE UP (ref 27–34)
MCHC RBC-ENTMCNC: 33.4 GM/DL — SIGNIFICANT CHANGE UP (ref 32–36)
MCV RBC AUTO: 96.4 FL — SIGNIFICANT CHANGE UP (ref 80–100)
PHOSPHATE SERPL-MCNC: 4.4 MG/DL — SIGNIFICANT CHANGE UP (ref 2.5–4.5)
PLATELET # BLD AUTO: 132 K/UL — LOW (ref 150–400)
POTASSIUM SERPL-MCNC: 5 MMOL/L — SIGNIFICANT CHANGE UP (ref 3.5–5.3)
POTASSIUM SERPL-SCNC: 5 MMOL/L — SIGNIFICANT CHANGE UP (ref 3.5–5.3)
PROTHROM AB SERPL-ACNC: 12 SEC — SIGNIFICANT CHANGE UP (ref 9.8–12.7)
RBC # BLD: 2.96 M/UL — LOW (ref 3.8–5.2)
RBC # FLD: 14.5 % — SIGNIFICANT CHANGE UP (ref 10.3–14.5)
SODIUM SERPL-SCNC: 143 MMOL/L — SIGNIFICANT CHANGE UP (ref 135–145)
WBC # BLD: 16.2 K/UL — HIGH (ref 3.8–10.5)
WBC # FLD AUTO: 16.2 K/UL — HIGH (ref 3.8–10.5)

## 2017-05-09 PROCEDURE — 99291 CRITICAL CARE FIRST HOUR: CPT

## 2017-05-09 PROCEDURE — 71010: CPT | Mod: 26

## 2017-05-09 PROCEDURE — 93010 ELECTROCARDIOGRAM REPORT: CPT

## 2017-05-09 RX ORDER — BUMETANIDE 0.25 MG/ML
1 INJECTION INTRAMUSCULAR; INTRAVENOUS DAILY
Qty: 0 | Refills: 0 | Status: DISCONTINUED | OUTPATIENT
Start: 2017-05-09 | End: 2017-05-15

## 2017-05-09 RX ORDER — LOSARTAN POTASSIUM 100 MG/1
50 TABLET, FILM COATED ORAL DAILY
Qty: 0 | Refills: 0 | Status: DISCONTINUED | OUTPATIENT
Start: 2017-05-09 | End: 2017-05-15

## 2017-05-09 RX ORDER — SODIUM CHLORIDE 9 MG/ML
1000 INJECTION INTRAMUSCULAR; INTRAVENOUS; SUBCUTANEOUS
Qty: 0 | Refills: 0 | Status: DISCONTINUED | OUTPATIENT
Start: 2017-05-09 | End: 2017-05-09

## 2017-05-09 RX ORDER — SERTRALINE 25 MG/1
25 TABLET, FILM COATED ORAL DAILY
Qty: 0 | Refills: 0 | Status: DISCONTINUED | OUTPATIENT
Start: 2017-05-09 | End: 2017-05-15

## 2017-05-09 RX ORDER — ACETAMINOPHEN 500 MG
650 TABLET ORAL EVERY 6 HOURS
Qty: 0 | Refills: 0 | Status: DISCONTINUED | OUTPATIENT
Start: 2017-05-09 | End: 2017-05-15

## 2017-05-09 RX ORDER — SENNA PLUS 8.6 MG/1
2 TABLET ORAL AT BEDTIME
Qty: 0 | Refills: 0 | Status: DISCONTINUED | OUTPATIENT
Start: 2017-05-09 | End: 2017-05-15

## 2017-05-09 RX ORDER — SODIUM CHLORIDE 9 MG/ML
1000 INJECTION, SOLUTION INTRAVENOUS
Qty: 0 | Refills: 0 | Status: DISCONTINUED | OUTPATIENT
Start: 2017-05-09 | End: 2017-05-09

## 2017-05-09 RX ORDER — ACETAMINOPHEN 500 MG
1000 TABLET ORAL ONCE
Qty: 0 | Refills: 0 | Status: COMPLETED | OUTPATIENT
Start: 2017-05-09 | End: 2017-05-09

## 2017-05-09 RX ORDER — DOCUSATE SODIUM 100 MG
100 CAPSULE ORAL THREE TIMES A DAY
Qty: 0 | Refills: 0 | Status: DISCONTINUED | OUTPATIENT
Start: 2017-05-09 | End: 2017-05-15

## 2017-05-09 RX ORDER — OXYCODONE HYDROCHLORIDE 5 MG/1
5 TABLET ORAL EVERY 4 HOURS
Qty: 0 | Refills: 0 | Status: DISCONTINUED | OUTPATIENT
Start: 2017-05-09 | End: 2017-05-15

## 2017-05-09 RX ADMIN — LOSARTAN POTASSIUM 50 MILLIGRAM(S): 100 TABLET, FILM COATED ORAL at 11:30

## 2017-05-09 RX ADMIN — SENNA PLUS 2 TABLET(S): 8.6 TABLET ORAL at 21:11

## 2017-05-09 RX ADMIN — Medication 100 MILLIGRAM(S): at 21:11

## 2017-05-09 RX ADMIN — HYDROMORPHONE HYDROCHLORIDE 0.5 MILLIGRAM(S): 2 INJECTION INTRAMUSCULAR; INTRAVENOUS; SUBCUTANEOUS at 00:29

## 2017-05-09 RX ADMIN — SERTRALINE 25 MILLIGRAM(S): 25 TABLET, FILM COATED ORAL at 11:30

## 2017-05-09 RX ADMIN — Medication 650 MILLIGRAM(S): at 18:38

## 2017-05-09 RX ADMIN — MEMANTINE HYDROCHLORIDE 10 MILLIGRAM(S): 10 TABLET ORAL at 23:41

## 2017-05-09 RX ADMIN — OXYCODONE HYDROCHLORIDE 5 MILLIGRAM(S): 5 TABLET ORAL at 14:45

## 2017-05-09 RX ADMIN — OXYCODONE HYDROCHLORIDE 5 MILLIGRAM(S): 5 TABLET ORAL at 15:15

## 2017-05-09 RX ADMIN — HYDROMORPHONE HYDROCHLORIDE 0.5 MILLIGRAM(S): 2 INJECTION INTRAMUSCULAR; INTRAVENOUS; SUBCUTANEOUS at 04:43

## 2017-05-09 RX ADMIN — Medication 650 MILLIGRAM(S): at 18:08

## 2017-05-09 RX ADMIN — BUMETANIDE 1 MILLIGRAM(S): 0.25 INJECTION INTRAMUSCULAR; INTRAVENOUS at 12:21

## 2017-05-09 RX ADMIN — HYDROMORPHONE HYDROCHLORIDE 0.5 MILLIGRAM(S): 2 INJECTION INTRAMUSCULAR; INTRAVENOUS; SUBCUTANEOUS at 00:09

## 2017-05-09 RX ADMIN — Medication 100 MILLIGRAM(S): at 13:21

## 2017-05-09 RX ADMIN — MEMANTINE HYDROCHLORIDE 10 MILLIGRAM(S): 10 TABLET ORAL at 11:30

## 2017-05-09 RX ADMIN — OXYCODONE HYDROCHLORIDE 5 MILLIGRAM(S): 5 TABLET ORAL at 20:42

## 2017-05-09 RX ADMIN — MEMANTINE HYDROCHLORIDE 10 MILLIGRAM(S): 10 TABLET ORAL at 00:09

## 2017-05-09 RX ADMIN — OXYCODONE HYDROCHLORIDE 5 MILLIGRAM(S): 5 TABLET ORAL at 21:12

## 2017-05-09 RX ADMIN — Medication 1 TABLET(S): at 11:30

## 2017-05-09 RX ADMIN — ENOXAPARIN SODIUM 40 MILLIGRAM(S): 100 INJECTION SUBCUTANEOUS at 11:30

## 2017-05-09 RX ADMIN — Medication 400 MILLIGRAM(S): at 04:27

## 2017-05-09 RX ADMIN — Medication 1000 MILLIGRAM(S): at 04:47

## 2017-05-09 RX ADMIN — HYDROMORPHONE HYDROCHLORIDE 0.5 MILLIGRAM(S): 2 INJECTION INTRAMUSCULAR; INTRAVENOUS; SUBCUTANEOUS at 05:03

## 2017-05-09 NOTE — PHYSICAL THERAPY INITIAL EVALUATION ADULT - ACTIVE RANGE OF MOTION EXAMINATION, REHAB EVAL
bilateral upper extremity Active ROM was WFL (within functional limits)/Right LE Active ROM was WFL (within functional limits)/deficits as listed below/L knee in KI and pt screaming in pain when L knee touched

## 2017-05-09 NOTE — PHYSICAL THERAPY INITIAL EVALUATION ADULT - ADDITIONAL COMMENTS
As per SW note, pt resides in a private house with son in [place]. Patient has 4 steps to enter, bedroom on main floor. Son states patient occasionally uses rolling walker for ambulation, and has wheelchair she uses when going to doctors appointments. Per son, patient has private hire home health aide- Monday-Friday from 11am-7pm who assists with ADLs.

## 2017-05-09 NOTE — PHYSICAL THERAPY INITIAL EVALUATION ADULT - RANGE OF MOTION EXAMINATION, REHAB EVAL
L knee in KI and pt screaming in pain when L knee touched/Right LE ROM was WFL (within functional limits)/deficits as listed below/bilateral upper extremity ROM was WFL (within functional limits)

## 2017-05-09 NOTE — PHYSICAL THERAPY INITIAL EVALUATION ADULT - PERTINENT HX OF CURRENT PROBLEM, REHAB EVAL
Pt is a 89 y/o female admitted to [facility] transferred from [facility] on 5/8/17 s/p fall. Pt was in bed, and her son heard a thump. He went into the patients room and found her on the floor next to her bed. Pt was complaining of left knee pain. Pt found to have a left distal femur periprosthetic fracture.

## 2017-05-09 NOTE — PHYSICAL THERAPY INITIAL EVALUATION ADULT - IMPAIRMENTS FOUND, PT EVAL
muscle strength/aerobic capacity/endurance/cognitive impairment/gait, locomotion, and balance/ROM/gross motor

## 2017-05-09 NOTE — PHYSICAL THERAPY INITIAL EVALUATION ADULT - PASSIVE RANGE OF MOTION EXAMINATION, REHAB EVAL
bilateral upper extremity Passive ROM was WFL (within functional limits)/deficits as listed below/L knee in KI and pt screaming in pain when L knee touched/Right LE Passive ROM was WFL (within functional limits)

## 2017-05-09 NOTE — PHYSICAL THERAPY INITIAL EVALUATION ADULT - PLANNED THERAPY INTERVENTIONS, PT EVAL
transfer training/gait training/strengthening/stair negotiation/ROM/balance training/bed mobility training

## 2017-05-10 LAB
ANION GAP SERPL CALC-SCNC: 14 MMOL/L — SIGNIFICANT CHANGE UP (ref 5–17)
BUN SERPL-MCNC: 36 MG/DL — HIGH (ref 7–23)
CALCIUM SERPL-MCNC: 9.6 MG/DL — SIGNIFICANT CHANGE UP (ref 8.4–10.5)
CHLORIDE SERPL-SCNC: 105 MMOL/L — SIGNIFICANT CHANGE UP (ref 96–108)
CO2 SERPL-SCNC: 24 MMOL/L — SIGNIFICANT CHANGE UP (ref 22–31)
CREAT SERPL-MCNC: 2.06 MG/DL — HIGH (ref 0.5–1.3)
GLUCOSE SERPL-MCNC: 100 MG/DL — HIGH (ref 70–99)
HCT VFR BLD CALC: 26.5 % — LOW (ref 34.5–45)
HGB BLD-MCNC: 8.8 G/DL — LOW (ref 11.5–15.5)
MCHC RBC-ENTMCNC: 32.6 PG — SIGNIFICANT CHANGE UP (ref 27–34)
MCHC RBC-ENTMCNC: 33.4 GM/DL — SIGNIFICANT CHANGE UP (ref 32–36)
MCV RBC AUTO: 97.6 FL — SIGNIFICANT CHANGE UP (ref 80–100)
PLATELET # BLD AUTO: 138 K/UL — LOW (ref 150–400)
POTASSIUM SERPL-MCNC: 4.9 MMOL/L — SIGNIFICANT CHANGE UP (ref 3.5–5.3)
POTASSIUM SERPL-SCNC: 4.9 MMOL/L — SIGNIFICANT CHANGE UP (ref 3.5–5.3)
RBC # BLD: 2.71 M/UL — LOW (ref 3.8–5.2)
RBC # FLD: 13.7 % — SIGNIFICANT CHANGE UP (ref 10.3–14.5)
SODIUM SERPL-SCNC: 143 MMOL/L — SIGNIFICANT CHANGE UP (ref 135–145)
WBC # BLD: 13.8 K/UL — HIGH (ref 3.8–10.5)
WBC # FLD AUTO: 13.8 K/UL — HIGH (ref 3.8–10.5)

## 2017-05-10 RX ADMIN — OXYCODONE HYDROCHLORIDE 5 MILLIGRAM(S): 5 TABLET ORAL at 02:51

## 2017-05-10 RX ADMIN — MEMANTINE HYDROCHLORIDE 10 MILLIGRAM(S): 10 TABLET ORAL at 13:05

## 2017-05-10 RX ADMIN — LOSARTAN POTASSIUM 50 MILLIGRAM(S): 100 TABLET, FILM COATED ORAL at 06:11

## 2017-05-10 RX ADMIN — MEMANTINE HYDROCHLORIDE 10 MILLIGRAM(S): 10 TABLET ORAL at 23:09

## 2017-05-10 RX ADMIN — SENNA PLUS 2 TABLET(S): 8.6 TABLET ORAL at 23:08

## 2017-05-10 RX ADMIN — Medication 100 MILLIGRAM(S): at 06:11

## 2017-05-10 RX ADMIN — OXYCODONE HYDROCHLORIDE 5 MILLIGRAM(S): 5 TABLET ORAL at 01:51

## 2017-05-10 RX ADMIN — BUMETANIDE 1 MILLIGRAM(S): 0.25 INJECTION INTRAMUSCULAR; INTRAVENOUS at 06:11

## 2017-05-10 RX ADMIN — Medication 100 MILLIGRAM(S): at 23:09

## 2017-05-10 RX ADMIN — Medication 1 TABLET(S): at 13:05

## 2017-05-11 LAB
BUN SERPL-MCNC: 39 MG/DL — HIGH (ref 7–23)
CALCIUM SERPL-MCNC: 9.4 MG/DL — SIGNIFICANT CHANGE UP (ref 8.4–10.5)
CHLORIDE SERPL-SCNC: 105 MMOL/L — SIGNIFICANT CHANGE UP (ref 96–108)
CO2 SERPL-SCNC: 26 MMOL/L — SIGNIFICANT CHANGE UP (ref 22–31)
CREAT SERPL-MCNC: 2.03 MG/DL — HIGH (ref 0.5–1.3)
GLUCOSE SERPL-MCNC: 100 MG/DL — HIGH (ref 70–99)
HCT VFR BLD CALC: 27.4 % — LOW (ref 34.5–45)
HGB BLD-MCNC: 7.8 G/DL — LOW (ref 11.5–15.5)
MCHC RBC-ENTMCNC: 27.9 PG — SIGNIFICANT CHANGE UP (ref 27–34)
MCHC RBC-ENTMCNC: 28.5 GM/DL — LOW (ref 32–36)
MCV RBC AUTO: 97.9 FL — SIGNIFICANT CHANGE UP (ref 80–100)
PLATELET # BLD AUTO: 161 K/UL — SIGNIFICANT CHANGE UP (ref 150–400)
POTASSIUM SERPL-MCNC: 4.2 MMOL/L — SIGNIFICANT CHANGE UP (ref 3.5–5.3)
POTASSIUM SERPL-SCNC: 4.2 MMOL/L — SIGNIFICANT CHANGE UP (ref 3.5–5.3)
RBC # BLD: 2.8 M/UL — LOW (ref 3.8–5.2)
RBC # FLD: 13.6 % — SIGNIFICANT CHANGE UP (ref 10.3–14.5)
SODIUM SERPL-SCNC: 144 MMOL/L — SIGNIFICANT CHANGE UP (ref 135–145)
WBC # BLD: 12.3 K/UL — HIGH (ref 3.8–10.5)
WBC # FLD AUTO: 12.3 K/UL — HIGH (ref 3.8–10.5)

## 2017-05-11 RX ADMIN — SERTRALINE 25 MILLIGRAM(S): 25 TABLET, FILM COATED ORAL at 11:59

## 2017-05-11 RX ADMIN — Medication 1 TABLET(S): at 11:58

## 2017-05-11 RX ADMIN — MEMANTINE HYDROCHLORIDE 10 MILLIGRAM(S): 10 TABLET ORAL at 11:59

## 2017-05-11 RX ADMIN — Medication 100 MILLIGRAM(S): at 06:05

## 2017-05-11 RX ADMIN — MEMANTINE HYDROCHLORIDE 10 MILLIGRAM(S): 10 TABLET ORAL at 23:37

## 2017-05-11 RX ADMIN — BUMETANIDE 1 MILLIGRAM(S): 0.25 INJECTION INTRAMUSCULAR; INTRAVENOUS at 06:05

## 2017-05-11 RX ADMIN — ENOXAPARIN SODIUM 40 MILLIGRAM(S): 100 INJECTION SUBCUTANEOUS at 11:59

## 2017-05-11 RX ADMIN — LOSARTAN POTASSIUM 50 MILLIGRAM(S): 100 TABLET, FILM COATED ORAL at 06:05

## 2017-05-11 RX ADMIN — Medication 100 MILLIGRAM(S): at 23:37

## 2017-05-11 RX ADMIN — SENNA PLUS 2 TABLET(S): 8.6 TABLET ORAL at 23:37

## 2017-05-12 LAB
ANION GAP SERPL CALC-SCNC: 13 MMOL/L — SIGNIFICANT CHANGE UP (ref 5–17)
ANION GAP SERPL CALC-SCNC: 13 MMOL/L — SIGNIFICANT CHANGE UP (ref 5–17)
APPEARANCE UR: CLEAR — SIGNIFICANT CHANGE UP
APTT BLD: 20.7 SEC — LOW (ref 27.5–37.4)
BILIRUB UR-MCNC: NEGATIVE — SIGNIFICANT CHANGE UP
BUN SERPL-MCNC: 46 MG/DL — HIGH (ref 7–23)
BUN SERPL-MCNC: 48 MG/DL — HIGH (ref 7–23)
CALCIUM SERPL-MCNC: 9.4 MG/DL — SIGNIFICANT CHANGE UP (ref 8.4–10.5)
CALCIUM SERPL-MCNC: 9.5 MG/DL — SIGNIFICANT CHANGE UP (ref 8.4–10.5)
CHLORIDE SERPL-SCNC: 105 MMOL/L — SIGNIFICANT CHANGE UP (ref 96–108)
CHLORIDE SERPL-SCNC: 106 MMOL/L — SIGNIFICANT CHANGE UP (ref 96–108)
CO2 SERPL-SCNC: 24 MMOL/L — SIGNIFICANT CHANGE UP (ref 22–31)
CO2 SERPL-SCNC: 26 MMOL/L — SIGNIFICANT CHANGE UP (ref 22–31)
COLOR SPEC: YELLOW — SIGNIFICANT CHANGE UP
CREAT SERPL-MCNC: 2.05 MG/DL — HIGH (ref 0.5–1.3)
CREAT SERPL-MCNC: 2.06 MG/DL — HIGH (ref 0.5–1.3)
DIFF PNL FLD: NEGATIVE — SIGNIFICANT CHANGE UP
GLUCOSE SERPL-MCNC: 100 MG/DL — HIGH (ref 70–99)
GLUCOSE SERPL-MCNC: 96 MG/DL — SIGNIFICANT CHANGE UP (ref 70–99)
GLUCOSE UR QL: NEGATIVE — SIGNIFICANT CHANGE UP
HCT VFR BLD CALC: 26.2 % — LOW (ref 34.5–45)
HCT VFR BLD CALC: 26.3 % — LOW (ref 34.5–45)
HGB BLD-MCNC: 8.3 G/DL — LOW (ref 11.5–15.5)
HGB BLD-MCNC: 8.3 G/DL — LOW (ref 11.5–15.5)
KETONES UR-MCNC: NEGATIVE — SIGNIFICANT CHANGE UP
LEUKOCYTE ESTERASE UR-ACNC: ABNORMAL
MCHC RBC-ENTMCNC: 31.5 GM/DL — LOW (ref 32–36)
MCHC RBC-ENTMCNC: 31.7 PG — SIGNIFICANT CHANGE UP (ref 27–34)
MCHC RBC-ENTMCNC: 31.8 GM/DL — LOW (ref 32–36)
MCHC RBC-ENTMCNC: 32 PG — SIGNIFICANT CHANGE UP (ref 27–34)
MCV RBC AUTO: 101 FL — HIGH (ref 80–100)
MCV RBC AUTO: 101 FL — HIGH (ref 80–100)
NITRITE UR-MCNC: NEGATIVE — SIGNIFICANT CHANGE UP
PH UR: 6 — SIGNIFICANT CHANGE UP (ref 5–8)
PLATELET # BLD AUTO: 179 K/UL — SIGNIFICANT CHANGE UP (ref 150–400)
PLATELET # BLD AUTO: 187 K/UL — SIGNIFICANT CHANGE UP (ref 150–400)
POTASSIUM SERPL-MCNC: 4.4 MMOL/L — SIGNIFICANT CHANGE UP (ref 3.5–5.3)
POTASSIUM SERPL-MCNC: 5.3 MMOL/L — SIGNIFICANT CHANGE UP (ref 3.5–5.3)
POTASSIUM SERPL-SCNC: 4.4 MMOL/L — SIGNIFICANT CHANGE UP (ref 3.5–5.3)
POTASSIUM SERPL-SCNC: 5.3 MMOL/L — SIGNIFICANT CHANGE UP (ref 3.5–5.3)
PROT UR-MCNC: NEGATIVE — SIGNIFICANT CHANGE UP
RBC # BLD: 2.6 M/UL — LOW (ref 3.8–5.2)
RBC # BLD: 2.61 M/UL — LOW (ref 3.8–5.2)
RBC # FLD: 13.3 % — SIGNIFICANT CHANGE UP (ref 10.3–14.5)
RBC # FLD: 13.4 % — SIGNIFICANT CHANGE UP (ref 10.3–14.5)
SODIUM SERPL-SCNC: 142 MMOL/L — SIGNIFICANT CHANGE UP (ref 135–145)
SODIUM SERPL-SCNC: 145 MMOL/L — SIGNIFICANT CHANGE UP (ref 135–145)
SP GR SPEC: 1.01 — SIGNIFICANT CHANGE UP (ref 1.01–1.02)
UROBILINOGEN FLD QL: NEGATIVE — SIGNIFICANT CHANGE UP
WBC # BLD: 8.9 K/UL — SIGNIFICANT CHANGE UP (ref 3.8–10.5)
WBC # BLD: 9.5 K/UL — SIGNIFICANT CHANGE UP (ref 3.8–10.5)
WBC # FLD AUTO: 8.9 K/UL — SIGNIFICANT CHANGE UP (ref 3.8–10.5)
WBC # FLD AUTO: 9.5 K/UL — SIGNIFICANT CHANGE UP (ref 3.8–10.5)

## 2017-05-12 PROCEDURE — 71010: CPT | Mod: 26

## 2017-05-12 PROCEDURE — 70450 CT HEAD/BRAIN W/O DYE: CPT | Mod: 26

## 2017-05-12 RX ADMIN — Medication 650 MILLIGRAM(S): at 16:48

## 2017-05-12 RX ADMIN — LOSARTAN POTASSIUM 50 MILLIGRAM(S): 100 TABLET, FILM COATED ORAL at 05:32

## 2017-05-12 RX ADMIN — ENOXAPARIN SODIUM 40 MILLIGRAM(S): 100 INJECTION SUBCUTANEOUS at 16:49

## 2017-05-12 RX ADMIN — BUMETANIDE 1 MILLIGRAM(S): 0.25 INJECTION INTRAMUSCULAR; INTRAVENOUS at 05:32

## 2017-05-12 RX ADMIN — MEMANTINE HYDROCHLORIDE 10 MILLIGRAM(S): 10 TABLET ORAL at 16:49

## 2017-05-12 RX ADMIN — SERTRALINE 25 MILLIGRAM(S): 25 TABLET, FILM COATED ORAL at 16:49

## 2017-05-12 RX ADMIN — Medication 650 MILLIGRAM(S): at 05:32

## 2017-05-12 RX ADMIN — Medication 650 MILLIGRAM(S): at 17:18

## 2017-05-12 NOTE — PROVIDER CONTACT NOTE (OTHER) - RECOMMENDATIONS
1 unit PRBC's   paged IV team if still unable, MD Vicente to attempt.
EKG stat, repeat HR was 93, 02Sat 98% on RA
unsecured mittens

## 2017-05-12 NOTE — PROVIDER CONTACT NOTE (OTHER) - ASSESSMENT
pt unable to tolerate oral meds, CT of head not done yet. A&OxO at baseline. vss. pt legtharic but improvement since code stroke was called. pt unable to tolerate oral meds, CT of head not done yet. A&OxO at baseline. vss. pt legtharic but improvement since code stroke was called. spoke with CT multiple times for pending CT of head.

## 2017-05-12 NOTE — PROVIDER CONTACT NOTE (OTHER) - BACKGROUND
Left LE ORIF, Dementia, Hyperkalemia
S/P FALL AT HOME   LEFT BATSHEVA PROSTHETIC FEMUR FRACTURE . ORIF OF LEFT DISTAL FEMUR.
ckd, post op, dementia
s/p L hip fx, code stroke earlier in shift.
s/p L Hip ORIF
s/p L hip fx. code stroke in AM

## 2017-05-12 NOTE — PROVIDER CONTACT NOTE (OTHER) - ASSESSMENT
Pt becoming increasingly confused, Pt A&OxO at baseline. vss. pulled out iv and un did leg dressing multiple times.

## 2017-05-12 NOTE — PROVIDER CONTACT NOTE (OTHER) - ASSESSMENT
Pt A&Ox0 at baseline. vss. Pt lethargic, not speaking, gazing into space. Found in bed very hard to arouse. with hx of demenita. At baseline pt A&OxO, but speaking and responding to questions. Last seen at this state about 1030.

## 2017-05-12 NOTE — PROVIDER CONTACT NOTE (OTHER) - NAME OF MD/NP/PA/DO NOTIFIED:
Alejandro Brown MD
Alejandro Brown MD (ortho)
Dr Ann
MD JUAN A CHACON
waldemar ruff
Alejandro Brown MD

## 2017-05-12 NOTE — PROVIDER CONTACT NOTE (OTHER) - REASON
PT IS COMBATIVE DURING MED PASS AND SPIT MEDS OUT.
urine outpt and H+H decrease
Pt becoming increasingly confused
Pt lethargic not speaking
pt unable to tolerate oral meds, CT of head not done yet

## 2017-05-12 NOTE — PROVIDER CONTACT NOTE (OTHER) - DATE AND TIME:
12-May-2017 10:55
12-May-2017 18:10
12-May-2017 13:35
08-May-2017 21:10
09-May-2017 23:00
10-May-2017 13:30

## 2017-05-13 LAB
AMMONIA BLD-MCNC: 15 UMOL/L — SIGNIFICANT CHANGE UP (ref 11–55)
ANION GAP SERPL CALC-SCNC: 13 MMOL/L — SIGNIFICANT CHANGE UP (ref 5–17)
BUN SERPL-MCNC: 53 MG/DL — HIGH (ref 7–23)
CALCIUM SERPL-MCNC: 9.7 MG/DL — SIGNIFICANT CHANGE UP (ref 8.4–10.5)
CHLORIDE SERPL-SCNC: 106 MMOL/L — SIGNIFICANT CHANGE UP (ref 96–108)
CO2 SERPL-SCNC: 27 MMOL/L — SIGNIFICANT CHANGE UP (ref 22–31)
CREAT SERPL-MCNC: 1.97 MG/DL — HIGH (ref 0.5–1.3)
FOLATE SERPL-MCNC: >20 NG/ML — SIGNIFICANT CHANGE UP (ref 4.8–24.2)
GLUCOSE SERPL-MCNC: 105 MG/DL — HIGH (ref 70–99)
HCT VFR BLD CALC: 26.5 % — LOW (ref 34.5–45)
HGB BLD-MCNC: 8.2 G/DL — LOW (ref 11.5–15.5)
MCHC RBC-ENTMCNC: 30.8 GM/DL — LOW (ref 32–36)
MCHC RBC-ENTMCNC: 30.9 PG — SIGNIFICANT CHANGE UP (ref 27–34)
MCV RBC AUTO: 100 FL — SIGNIFICANT CHANGE UP (ref 80–100)
PLATELET # BLD AUTO: 213 K/UL — SIGNIFICANT CHANGE UP (ref 150–400)
POTASSIUM SERPL-MCNC: 4.7 MMOL/L — SIGNIFICANT CHANGE UP (ref 3.5–5.3)
POTASSIUM SERPL-SCNC: 4.7 MMOL/L — SIGNIFICANT CHANGE UP (ref 3.5–5.3)
RBC # BLD: 2.64 M/UL — LOW (ref 3.8–5.2)
RBC # FLD: 12.9 % — SIGNIFICANT CHANGE UP (ref 10.3–14.5)
SODIUM SERPL-SCNC: 146 MMOL/L — HIGH (ref 135–145)
TSH SERPL-MCNC: 0.84 UIU/ML — SIGNIFICANT CHANGE UP (ref 0.27–4.2)
VIT B12 SERPL-MCNC: 1461 PG/ML — HIGH (ref 243–894)
WBC # BLD: 8.4 K/UL — SIGNIFICANT CHANGE UP (ref 3.8–10.5)
WBC # FLD AUTO: 8.4 K/UL — SIGNIFICANT CHANGE UP (ref 3.8–10.5)

## 2017-05-13 PROCEDURE — 70450 CT HEAD/BRAIN W/O DYE: CPT | Mod: 26

## 2017-05-13 RX ADMIN — Medication 100 MILLIGRAM(S): at 16:40

## 2017-05-13 RX ADMIN — Medication 100 MILLIGRAM(S): at 22:23

## 2017-05-13 RX ADMIN — OXYCODONE HYDROCHLORIDE 5 MILLIGRAM(S): 5 TABLET ORAL at 22:22

## 2017-05-13 RX ADMIN — OXYCODONE HYDROCHLORIDE 5 MILLIGRAM(S): 5 TABLET ORAL at 16:39

## 2017-05-13 RX ADMIN — SENNA PLUS 2 TABLET(S): 8.6 TABLET ORAL at 22:23

## 2017-05-13 RX ADMIN — Medication 650 MILLIGRAM(S): at 12:45

## 2017-05-13 RX ADMIN — OXYCODONE HYDROCHLORIDE 5 MILLIGRAM(S): 5 TABLET ORAL at 17:10

## 2017-05-13 RX ADMIN — OXYCODONE HYDROCHLORIDE 5 MILLIGRAM(S): 5 TABLET ORAL at 23:22

## 2017-05-13 RX ADMIN — ENOXAPARIN SODIUM 40 MILLIGRAM(S): 100 INJECTION SUBCUTANEOUS at 16:40

## 2017-05-13 RX ADMIN — BUMETANIDE 1 MILLIGRAM(S): 0.25 INJECTION INTRAMUSCULAR; INTRAVENOUS at 06:26

## 2017-05-13 RX ADMIN — MEMANTINE HYDROCHLORIDE 10 MILLIGRAM(S): 10 TABLET ORAL at 06:26

## 2017-05-13 RX ADMIN — Medication 1 TABLET(S): at 12:16

## 2017-05-13 RX ADMIN — MEMANTINE HYDROCHLORIDE 10 MILLIGRAM(S): 10 TABLET ORAL at 18:34

## 2017-05-13 RX ADMIN — Medication 650 MILLIGRAM(S): at 12:14

## 2017-05-13 RX ADMIN — LOSARTAN POTASSIUM 50 MILLIGRAM(S): 100 TABLET, FILM COATED ORAL at 06:27

## 2017-05-13 RX ADMIN — Medication 100 MILLIGRAM(S): at 06:27

## 2017-05-13 RX ADMIN — SERTRALINE 25 MILLIGRAM(S): 25 TABLET, FILM COATED ORAL at 18:34

## 2017-05-14 RX ADMIN — Medication 100 MILLIGRAM(S): at 12:56

## 2017-05-14 RX ADMIN — OXYCODONE HYDROCHLORIDE 5 MILLIGRAM(S): 5 TABLET ORAL at 03:50

## 2017-05-14 RX ADMIN — OXYCODONE HYDROCHLORIDE 5 MILLIGRAM(S): 5 TABLET ORAL at 12:56

## 2017-05-14 RX ADMIN — LOSARTAN POTASSIUM 50 MILLIGRAM(S): 100 TABLET, FILM COATED ORAL at 05:30

## 2017-05-14 RX ADMIN — MEMANTINE HYDROCHLORIDE 10 MILLIGRAM(S): 10 TABLET ORAL at 05:37

## 2017-05-14 RX ADMIN — OXYCODONE HYDROCHLORIDE 5 MILLIGRAM(S): 5 TABLET ORAL at 09:50

## 2017-05-14 RX ADMIN — OXYCODONE HYDROCHLORIDE 5 MILLIGRAM(S): 5 TABLET ORAL at 04:50

## 2017-05-14 RX ADMIN — MEMANTINE HYDROCHLORIDE 10 MILLIGRAM(S): 10 TABLET ORAL at 17:54

## 2017-05-14 RX ADMIN — ENOXAPARIN SODIUM 40 MILLIGRAM(S): 100 INJECTION SUBCUTANEOUS at 17:54

## 2017-05-14 RX ADMIN — Medication 100 MILLIGRAM(S): at 05:34

## 2017-05-14 RX ADMIN — BUMETANIDE 1 MILLIGRAM(S): 0.25 INJECTION INTRAMUSCULAR; INTRAVENOUS at 05:31

## 2017-05-14 RX ADMIN — SERTRALINE 25 MILLIGRAM(S): 25 TABLET, FILM COATED ORAL at 17:54

## 2017-05-14 RX ADMIN — OXYCODONE HYDROCHLORIDE 5 MILLIGRAM(S): 5 TABLET ORAL at 13:30

## 2017-05-14 RX ADMIN — Medication 1 TABLET(S): at 12:56

## 2017-05-14 RX ADMIN — OXYCODONE HYDROCHLORIDE 5 MILLIGRAM(S): 5 TABLET ORAL at 08:23

## 2017-05-15 ENCOUNTER — TRANSCRIPTION ENCOUNTER (OUTPATIENT)
Age: 82
End: 2017-05-15

## 2017-05-15 VITALS
SYSTOLIC BLOOD PRESSURE: 143 MMHG | TEMPERATURE: 97 F | RESPIRATION RATE: 18 BRPM | HEART RATE: 70 BPM | DIASTOLIC BLOOD PRESSURE: 67 MMHG | OXYGEN SATURATION: 96 %

## 2017-05-15 LAB
BUN SERPL-MCNC: 48 MG/DL — HIGH (ref 7–23)
CALCIUM SERPL-MCNC: 9.9 MG/DL — SIGNIFICANT CHANGE UP (ref 8.4–10.5)
CHLORIDE SERPL-SCNC: 107 MMOL/L — SIGNIFICANT CHANGE UP (ref 96–108)
CO2 SERPL-SCNC: 25 MMOL/L — SIGNIFICANT CHANGE UP (ref 22–31)
CREAT SERPL-MCNC: 1.92 MG/DL — HIGH (ref 0.5–1.3)
GLUCOSE SERPL-MCNC: 100 MG/DL — HIGH (ref 70–99)
HCT VFR BLD CALC: 27.8 % — LOW (ref 34.5–45)
HGB BLD-MCNC: 8.9 G/DL — LOW (ref 11.5–15.5)
MCHC RBC-ENTMCNC: 32.1 GM/DL — SIGNIFICANT CHANGE UP (ref 32–36)
MCHC RBC-ENTMCNC: 32.9 PG — SIGNIFICANT CHANGE UP (ref 27–34)
MCV RBC AUTO: 102 FL — HIGH (ref 80–100)
PLATELET # BLD AUTO: 264 K/UL — SIGNIFICANT CHANGE UP (ref 150–400)
POTASSIUM SERPL-MCNC: 4.9 MMOL/L — SIGNIFICANT CHANGE UP (ref 3.5–5.3)
POTASSIUM SERPL-SCNC: 4.9 MMOL/L — SIGNIFICANT CHANGE UP (ref 3.5–5.3)
RBC # BLD: 2.72 M/UL — LOW (ref 3.8–5.2)
RBC # FLD: 13.1 % — SIGNIFICANT CHANGE UP (ref 10.3–14.5)
SODIUM SERPL-SCNC: 147 MMOL/L — HIGH (ref 135–145)
WBC # BLD: 13.5 K/UL — HIGH (ref 3.8–10.5)
WBC # FLD AUTO: 13.5 K/UL — HIGH (ref 3.8–10.5)

## 2017-05-15 PROCEDURE — 86901 BLOOD TYPING SEROLOGIC RH(D): CPT

## 2017-05-15 PROCEDURE — 97110 THERAPEUTIC EXERCISES: CPT

## 2017-05-15 PROCEDURE — 85027 COMPLETE CBC AUTOMATED: CPT

## 2017-05-15 PROCEDURE — 97530 THERAPEUTIC ACTIVITIES: CPT

## 2017-05-15 PROCEDURE — 93880 EXTRACRANIAL BILAT STUDY: CPT

## 2017-05-15 PROCEDURE — 82803 BLOOD GASES ANY COMBINATION: CPT

## 2017-05-15 PROCEDURE — 86850 RBC ANTIBODY SCREEN: CPT

## 2017-05-15 PROCEDURE — 93005 ELECTROCARDIOGRAM TRACING: CPT

## 2017-05-15 PROCEDURE — 80053 COMPREHEN METABOLIC PANEL: CPT

## 2017-05-15 PROCEDURE — 97116 GAIT TRAINING THERAPY: CPT

## 2017-05-15 PROCEDURE — 85730 THROMBOPLASTIN TIME PARTIAL: CPT

## 2017-05-15 PROCEDURE — 82746 ASSAY OF FOLIC ACID SERUM: CPT

## 2017-05-15 PROCEDURE — 84100 ASSAY OF PHOSPHORUS: CPT

## 2017-05-15 PROCEDURE — 85014 HEMATOCRIT: CPT

## 2017-05-15 PROCEDURE — 82330 ASSAY OF CALCIUM: CPT

## 2017-05-15 PROCEDURE — 84443 ASSAY THYROID STIM HORMONE: CPT

## 2017-05-15 PROCEDURE — 83735 ASSAY OF MAGNESIUM: CPT

## 2017-05-15 PROCEDURE — 86900 BLOOD TYPING SEROLOGIC ABO: CPT

## 2017-05-15 PROCEDURE — 83605 ASSAY OF LACTIC ACID: CPT

## 2017-05-15 PROCEDURE — 76000 FLUOROSCOPY <1 HR PHYS/QHP: CPT

## 2017-05-15 PROCEDURE — 86923 COMPATIBILITY TEST ELECTRIC: CPT

## 2017-05-15 PROCEDURE — 84132 ASSAY OF SERUM POTASSIUM: CPT

## 2017-05-15 PROCEDURE — 81001 URINALYSIS AUTO W/SCOPE: CPT

## 2017-05-15 PROCEDURE — 70450 CT HEAD/BRAIN W/O DYE: CPT

## 2017-05-15 PROCEDURE — 82435 ASSAY OF BLOOD CHLORIDE: CPT

## 2017-05-15 PROCEDURE — 85610 PROTHROMBIN TIME: CPT

## 2017-05-15 PROCEDURE — 71045 X-RAY EXAM CHEST 1 VIEW: CPT

## 2017-05-15 PROCEDURE — 84295 ASSAY OF SERUM SODIUM: CPT

## 2017-05-15 PROCEDURE — P9016: CPT

## 2017-05-15 PROCEDURE — 82607 VITAMIN B-12: CPT

## 2017-05-15 PROCEDURE — 82947 ASSAY GLUCOSE BLOOD QUANT: CPT

## 2017-05-15 PROCEDURE — 97162 PT EVAL MOD COMPLEX 30 MIN: CPT

## 2017-05-15 PROCEDURE — 82140 ASSAY OF AMMONIA: CPT

## 2017-05-15 PROCEDURE — 80048 BASIC METABOLIC PNL TOTAL CA: CPT

## 2017-05-15 PROCEDURE — 93306 TTE W/DOPPLER COMPLETE: CPT

## 2017-05-15 PROCEDURE — 36430 TRANSFUSION BLD/BLD COMPNT: CPT

## 2017-05-15 PROCEDURE — C1713: CPT

## 2017-05-15 RX ORDER — OXYCODONE HYDROCHLORIDE 5 MG/1
1 TABLET ORAL
Qty: 0 | Refills: 0 | COMMUNITY
Start: 2017-05-15

## 2017-05-15 RX ORDER — DOCUSATE SODIUM 100 MG
1 CAPSULE ORAL
Qty: 0 | Refills: 0 | COMMUNITY
Start: 2017-05-15

## 2017-05-15 RX ORDER — ACETAMINOPHEN 500 MG
2 TABLET ORAL
Qty: 0 | Refills: 0 | COMMUNITY
Start: 2017-05-15

## 2017-05-15 RX ORDER — ENOXAPARIN SODIUM 100 MG/ML
40 INJECTION SUBCUTANEOUS
Qty: 0 | Refills: 0 | COMMUNITY
Start: 2017-05-15

## 2017-05-15 RX ADMIN — BUMETANIDE 1 MILLIGRAM(S): 0.25 INJECTION INTRAMUSCULAR; INTRAVENOUS at 05:58

## 2017-05-15 RX ADMIN — OXYCODONE HYDROCHLORIDE 5 MILLIGRAM(S): 5 TABLET ORAL at 06:02

## 2017-05-15 RX ADMIN — MEMANTINE HYDROCHLORIDE 10 MILLIGRAM(S): 10 TABLET ORAL at 05:58

## 2017-05-15 RX ADMIN — Medication 650 MILLIGRAM(S): at 14:43

## 2017-05-15 RX ADMIN — OXYCODONE HYDROCHLORIDE 5 MILLIGRAM(S): 5 TABLET ORAL at 11:30

## 2017-05-15 RX ADMIN — OXYCODONE HYDROCHLORIDE 5 MILLIGRAM(S): 5 TABLET ORAL at 11:03

## 2017-05-15 RX ADMIN — Medication 1 TABLET(S): at 14:42

## 2017-05-15 RX ADMIN — LOSARTAN POTASSIUM 50 MILLIGRAM(S): 100 TABLET, FILM COATED ORAL at 06:06

## 2017-05-15 NOTE — DISCHARGE NOTE ADULT - PATIENT PORTAL LINK FT
“You can access the FollowHealth Patient Portal, offered by [facility], by registering with the following website: [email or web address]”

## 2017-05-15 NOTE — DISCHARGE NOTE ADULT - CONDITIONS AT DISCHARGE
ot a&o x's 1  to self   pleasantly  confused  unable to retain information    pt does not have iv access this shift   team and md's aware.    left leg incision  freddie  well approximated   cdi    immobilizer in place.    oob to chair with 2 assist and walker, tolerating  reg diet,  pain controlled well by oral meds and voiding adequate urine  although incontinent.

## 2017-05-15 NOTE — DISCHARGE NOTE ADULT - MEDICATION SUMMARY - MEDICATIONS TO TAKE
I will START or STAY ON the medications listed below when I get home from the hospital:    acetaminophen 325 mg oral tablet  -- 2 tab(s) by mouth every 6 hours, As needed, Mild Pain (1 - 3)  -- Indication: For mild pain    oxyCODONE 5 mg oral tablet  -- 1 tab(s) by mouth every 4 hours, As needed, Moderate Pain (4 - 6)  -- Indication: For moderate pain    Benicar 20 mg oral tablet  -- 1 tab(s) by mouth once a day  -- Indication: For per PMD    enoxaparin  -- 40 milligram(s) subcutaneous once a day for 30 days for DVT ppx  -- Indication: For DVT ppx    Zoloft 25 mg oral tablet  -- 1 tab(s) by mouth once a day  -- Indication: For per PMD    cholestyramine 4 g/5 g oral powder for reconstitution  --  by mouth 3 times a day  -- Indication: For per PMD    Uloric 40 mg oral tablet  -- 1 tab(s) by mouth once a day  -- Indication: For per PMD    anastrozole 1 mg oral tablet  -- 1 tab(s) by mouth once a day  -- Indication: For per PMD    bumetanide 1 mg oral tablet  -- 1 tab(s) by mouth once a day  -- Indication: For per PMD    docusate sodium 100 mg oral capsule  -- 1 cap(s) by mouth 3 times a day  -- Indication: For Constipation    Namenda 10 mg oral tablet  -- 1 tab(s) by mouth 2 times a day  -- Indication: For per PMD    multivitamin  -- 1 tab(s)  once a day  -- Indication: For Vitamin

## 2017-05-15 NOTE — DISCHARGE NOTE ADULT - NS AS ACTIVITY OBS
Walking-Indoors allowed/Stairs allowed/No Heavy lifting/straining/Weight bearing as tolerated in Knee Immobilizer with assistive devices as needed./Walking-Outdoors allowed/Showering allowed

## 2017-05-15 NOTE — DISCHARGE NOTE ADULT - INSTRUCTIONS
Resume normal diet. Please avoid alcohol when taking pain medications pt dc'd to rehab  call pmd if you have fever greater than 100 degrees, if you have pain unrelieved by medication ,  if your incision site becomes red, swollen, warm to touch,  if you see any oozing from site, or if you see fresh red blood.  call if you have difficulty urinating or if you have nausea or vomiting

## 2017-05-15 NOTE — DISCHARGE NOTE ADULT - PLAN OF CARE
To return to baseline activities of daily living - Pain Control  - Walking with full weight bearing as tolerated in Knee Immobilizer, with assistive devices (walker/Cane as Needed)  - DVT Prophylaxis with Lovenox  - PT as needed  - Follow up with Dr. Merrill as Outpatient in 7-10 Days after Discharge from the Hospital or Rehab. Call Office For Appointment.   - Remove Staples Post-Op Day 14, and Remove Dressing Post-Op Day 10, with Daily Dressing Changes as Need.  - Ice/Elevate affected area as Needed  - Keep Dressing Clean and dry.

## 2017-05-15 NOTE — DISCHARGE NOTE ADULT - CARE PROVIDER_API CALL
Donte Merrill (MD), Orthopaedic Surgery  [address] [place] [address]  Phone: [phone]  Fax: [phone]

## 2017-05-15 NOTE — DISCHARGE NOTE ADULT - CARE PLAN
Principal Discharge DX:	Periprosthetic fracture around internal prosthetic left knee joint, initial encounter  Goal:	To return to baseline activities of daily living  Instructions for follow-up, activity and diet:	- Pain Control  - Walking with full weight bearing as tolerated in Knee Immobilizer, with assistive devices (walker/Cane as Needed)  - DVT Prophylaxis with Lovenox  - PT as needed  - Follow up with Dr. Merrill as Outpatient in 7-10 Days after Discharge from the Hospital or Rehab. Call Office For Appointment.   - Remove Staples Post-Op Day 14, and Remove Dressing Post-Op Day 10, with Daily Dressing Changes as Need.  - Ice/Elevate affected area as Needed  - Keep Dressing Clean and dry.

## 2017-05-15 NOTE — DISCHARGE NOTE ADULT - HOSPITAL COURSE
The patient is a ___ year old ____ status post Open Reduction Surgical Fixation of a ______ Fracture after being admitted through [facility] Emergency Room. The Patient was medically Optimized for the Previously mentioned surgical procedure. The patient was taken to the operating room on date mentioned above. Prophylactic antibiotics were started before the procedure and continued for 24 hours. There were no complications during the procedure and patient tolerated the procedure well. The patient was transferred to recovery room in stable condition and subsequently to surgical floor. Patient was placed on Lovenox &amp; Heparin &amp; Aspirin &amp; Xarelto &amp; Coumadin for anticoagulation. All home medications were continued. The patient received physical therapy daily and daily labs were followed. The dressing / Splint/ Cast/ Brace was kept clean, dry, intact and changed on POD 3. *The rest of the hospital stay was unremarkable.* The patient was discharged in stable condition to follow up as outpatient. The patient is a 89 year old female status post Open Reduction Surgical Fixation of a Periprosthetic Femur Fracture after being admitted through the [facility] Emergency Room. The Patient was medically Optimized for the Previously mentioned surgical procedure. The patient was taken to the operating room on date mentioned above. Prophylactic antibiotics were started before the procedure and continued for 24 hours. There were no complications during the procedure and patient tolerated the procedure well. The patient was transferred to recovery room in stable condition and subsequently to surgical floor. Patient was placed on Lovenox for anticoagulation. All home medications were continued. The patient received physical therapy daily and daily labs were followed. The dressing/ Brace was kept clean, dry, intact and changed on POD 3 and 7. The patient had a code stroke called on 5/12 for confusion. The patient received two head CT scans which were both negative. The patient received 1U of PRBCs on 5/8. The patient was discharged in stable condition to follow up as outpatient.

## 2017-05-24 ENCOUNTER — APPOINTMENT (OUTPATIENT)
Dept: ORTHOPEDIC SURGERY | Facility: CLINIC | Age: 82
End: 2017-05-24

## 2017-05-24 VITALS
WEIGHT: 235 LBS | DIASTOLIC BLOOD PRESSURE: 82 MMHG | HEIGHT: 69 IN | BODY MASS INDEX: 34.8 KG/M2 | SYSTOLIC BLOOD PRESSURE: 143 MMHG

## 2017-05-24 DIAGNOSIS — Z78.9 OTHER SPECIFIED HEALTH STATUS: ICD-10-CM

## 2017-05-24 DIAGNOSIS — Z82.62 FAMILY HISTORY OF OSTEOPOROSIS: ICD-10-CM

## 2017-05-24 DIAGNOSIS — Z87.81 PERSONAL HISTORY OF (HEALED) TRAUMATIC FRACTURE: ICD-10-CM

## 2017-05-24 DIAGNOSIS — Z82.61 FAMILY HISTORY OF ARTHRITIS: ICD-10-CM

## 2017-05-24 DIAGNOSIS — Z80.3 FAMILY HISTORY OF MALIGNANT NEOPLASM OF BREAST: ICD-10-CM

## 2017-06-21 ENCOUNTER — APPOINTMENT (OUTPATIENT)
Dept: ORTHOPEDIC SURGERY | Facility: CLINIC | Age: 82
End: 2017-06-21
Payer: MEDICARE

## 2017-06-21 DIAGNOSIS — M97.12XA PERIPROSTHETIC FRACTURE AROUND INTERNAL PROSTHETIC LEFT KNEE JOINT, INITIAL ENCOUNTER: ICD-10-CM

## 2017-06-21 PROCEDURE — 99024 POSTOP FOLLOW-UP VISIT: CPT

## 2017-06-21 PROCEDURE — 73552 X-RAY EXAM OF FEMUR 2/>: CPT

## 2017-06-23 PROBLEM — M97.12XA PERIPROSTHETIC FRACTURE AROUND INTERNAL PROSTHETIC LEFT KNEE JOINT: Status: ACTIVE | Noted: 2017-05-24

## 2017-07-26 ENCOUNTER — APPOINTMENT (OUTPATIENT)
Dept: ORTHOPEDIC SURGERY | Facility: CLINIC | Age: 82
End: 2017-07-26

## 2017-07-31 ENCOUNTER — INPATIENT (INPATIENT)
Facility: HOSPITAL | Age: 82
LOS: 2 days | Discharge: HOME HEALTH | End: 2017-08-03
Attending: HOSPITALIST | Admitting: HOSPITALIST
Payer: MEDICARE

## 2017-07-31 VITALS
SYSTOLIC BLOOD PRESSURE: 135 MMHG | DIASTOLIC BLOOD PRESSURE: 68 MMHG | TEMPERATURE: 98 F | WEIGHT: 199.96 LBS | OXYGEN SATURATION: 97 % | RESPIRATION RATE: 18 BRPM | HEART RATE: 78 BPM

## 2017-07-31 DIAGNOSIS — Z96.652 PRESENCE OF LEFT ARTIFICIAL KNEE JOINT: Chronic | ICD-10-CM

## 2017-07-31 DIAGNOSIS — Z96.651 PRESENCE OF RIGHT ARTIFICIAL KNEE JOINT: Chronic | ICD-10-CM

## 2017-07-31 LAB
ALBUMIN SERPL ELPH-MCNC: 3.4 G/DL — SIGNIFICANT CHANGE UP (ref 3.3–5)
ALP SERPL-CCNC: 69 U/L — SIGNIFICANT CHANGE UP (ref 40–120)
ALT FLD-CCNC: 14 U/L — SIGNIFICANT CHANGE UP (ref 12–78)
ANION GAP SERPL CALC-SCNC: 8 MMOL/L — SIGNIFICANT CHANGE UP (ref 5–17)
ANISOCYTOSIS BLD QL: SLIGHT — SIGNIFICANT CHANGE UP
APTT BLD: 24.7 SEC — LOW (ref 27.5–37.4)
AST SERPL-CCNC: 15 U/L — SIGNIFICANT CHANGE UP (ref 15–37)
BASOPHILS # BLD AUTO: 0.1 K/UL — SIGNIFICANT CHANGE UP (ref 0–0.2)
BASOPHILS # BLD AUTO: 0.1 K/UL — SIGNIFICANT CHANGE UP (ref 0–0.2)
BASOPHILS NFR BLD AUTO: 1.1 % — SIGNIFICANT CHANGE UP (ref 0–2)
BASOPHILS NFR BLD AUTO: 1.2 % — SIGNIFICANT CHANGE UP (ref 0–2)
BILIRUB SERPL-MCNC: 0.3 MG/DL — SIGNIFICANT CHANGE UP (ref 0.2–1.2)
BUN SERPL-MCNC: 32 MG/DL — HIGH (ref 7–23)
CALCIUM SERPL-MCNC: 9.5 MG/DL — SIGNIFICANT CHANGE UP (ref 8.5–10.1)
CHLORIDE SERPL-SCNC: 105 MMOL/L — SIGNIFICANT CHANGE UP (ref 96–108)
CK MB BLD-MCNC: <1.1 % — SIGNIFICANT CHANGE UP (ref 0–3.5)
CK MB CFR SERPL CALC: <0.5 NG/ML — SIGNIFICANT CHANGE UP (ref 0.5–3.6)
CK SERPL-CCNC: 45 U/L — SIGNIFICANT CHANGE UP (ref 26–192)
CO2 SERPL-SCNC: 27 MMOL/L — SIGNIFICANT CHANGE UP (ref 22–31)
CREAT SERPL-MCNC: 1.94 MG/DL — HIGH (ref 0.5–1.3)
EOSINOPHIL # BLD AUTO: 0.3 K/UL — SIGNIFICANT CHANGE UP (ref 0–0.5)
EOSINOPHIL # BLD AUTO: 0.3 K/UL — SIGNIFICANT CHANGE UP (ref 0–0.5)
EOSINOPHIL NFR BLD AUTO: 3.3 % — SIGNIFICANT CHANGE UP (ref 0–6)
EOSINOPHIL NFR BLD AUTO: 3.5 % — SIGNIFICANT CHANGE UP (ref 0–6)
GLUCOSE SERPL-MCNC: 105 MG/DL — HIGH (ref 70–99)
HCT VFR BLD CALC: 31.3 % — LOW (ref 34.5–45)
HCT VFR BLD CALC: 32 % — LOW (ref 34.5–45)
HGB BLD-MCNC: 8.8 G/DL — LOW (ref 11.5–15.5)
HGB BLD-MCNC: 9.7 G/DL — LOW (ref 11.5–15.5)
HYPOCHROMIA BLD QL: SLIGHT — SIGNIFICANT CHANGE UP
INR BLD: 1.08 RATIO — SIGNIFICANT CHANGE UP (ref 0.88–1.16)
LYMPHOCYTES # BLD AUTO: 1.5 K/UL — SIGNIFICANT CHANGE UP (ref 1–3.3)
LYMPHOCYTES # BLD AUTO: 1.8 K/UL — SIGNIFICANT CHANGE UP (ref 1–3.3)
LYMPHOCYTES # BLD AUTO: 16.2 % — SIGNIFICANT CHANGE UP (ref 13–44)
LYMPHOCYTES # BLD AUTO: 18.7 % — SIGNIFICANT CHANGE UP (ref 13–44)
MACROCYTES BLD QL: SLIGHT — SIGNIFICANT CHANGE UP
MCHC RBC-ENTMCNC: 27.2 PG — SIGNIFICANT CHANGE UP (ref 27–34)
MCHC RBC-ENTMCNC: 27.5 GM/DL — LOW (ref 32–36)
MCHC RBC-ENTMCNC: 30.6 PG — SIGNIFICANT CHANGE UP (ref 27–34)
MCHC RBC-ENTMCNC: 31.1 GM/DL — LOW (ref 32–36)
MCV RBC AUTO: 98.3 FL — SIGNIFICANT CHANGE UP (ref 80–100)
MCV RBC AUTO: 99 FL — SIGNIFICANT CHANGE UP (ref 80–100)
MONOCYTES # BLD AUTO: 1.1 K/UL — HIGH (ref 0–0.9)
MONOCYTES # BLD AUTO: 1.1 K/UL — HIGH (ref 0–0.9)
MONOCYTES NFR BLD AUTO: 11.2 % — SIGNIFICANT CHANGE UP (ref 2–14)
MONOCYTES NFR BLD AUTO: 12.5 % — SIGNIFICANT CHANGE UP (ref 2–14)
NEUTROPHILS # BLD AUTO: 6.1 K/UL — SIGNIFICANT CHANGE UP (ref 1.8–7.4)
NEUTROPHILS # BLD AUTO: 6.2 K/UL — SIGNIFICANT CHANGE UP (ref 1.8–7.4)
NEUTROPHILS NFR BLD AUTO: 65.6 % — SIGNIFICANT CHANGE UP (ref 43–77)
NEUTROPHILS NFR BLD AUTO: 66.6 % — SIGNIFICANT CHANGE UP (ref 43–77)
NT-PROBNP SERPL-SCNC: 931 PG/ML — HIGH (ref 0–450)
PLAT MORPH BLD: NORMAL — SIGNIFICANT CHANGE UP
PLATELET # BLD AUTO: 249 K/UL — SIGNIFICANT CHANGE UP (ref 150–400)
PLATELET # BLD AUTO: 260 K/UL — SIGNIFICANT CHANGE UP (ref 150–400)
POTASSIUM SERPL-MCNC: 5 MMOL/L — SIGNIFICANT CHANGE UP (ref 3.5–5.3)
POTASSIUM SERPL-SCNC: 5 MMOL/L — SIGNIFICANT CHANGE UP (ref 3.5–5.3)
PROT SERPL-MCNC: 8.6 GM/DL — HIGH (ref 6–8.3)
PROTHROM AB SERPL-ACNC: 11.8 SEC — SIGNIFICANT CHANGE UP (ref 9.8–12.7)
RBC # BLD: 3.18 M/UL — LOW (ref 3.8–5.2)
RBC # BLD: 3.24 M/UL — LOW (ref 3.8–5.2)
RBC # FLD: 14.3 % — SIGNIFICANT CHANGE UP (ref 11–15)
RBC # FLD: 14.8 % — SIGNIFICANT CHANGE UP (ref 11–15)
RBC BLD AUTO: ABNORMAL
SODIUM SERPL-SCNC: 140 MMOL/L — SIGNIFICANT CHANGE UP (ref 135–145)
TROPONIN I SERPL-MCNC: <.015 NG/ML — SIGNIFICANT CHANGE UP (ref 0.01–0.04)
WBC # BLD: 9.1 K/UL — SIGNIFICANT CHANGE UP (ref 3.8–10.5)
WBC # BLD: 9.5 K/UL — SIGNIFICANT CHANGE UP (ref 3.8–10.5)
WBC # FLD AUTO: 9.1 K/UL — SIGNIFICANT CHANGE UP (ref 3.8–10.5)
WBC # FLD AUTO: 9.5 K/UL — SIGNIFICANT CHANGE UP (ref 3.8–10.5)

## 2017-07-31 PROCEDURE — 99285 EMERGENCY DEPT VISIT HI MDM: CPT

## 2017-07-31 PROCEDURE — 93970 EXTREMITY STUDY: CPT | Mod: 26

## 2017-07-31 PROCEDURE — 71010: CPT | Mod: 26

## 2017-07-31 PROCEDURE — 99223 1ST HOSP IP/OBS HIGH 75: CPT

## 2017-07-31 NOTE — ED PROVIDER NOTE - OBJECTIVE STATEMENT
89 year old female with PMH of dementia, HLD, HTN, breast CA 89 year old female with PMH of dementia, HLD, HTN, breast CA, had tib/fib fracture in April after 2 months rehab sent home, has been able to go from bed to wheelchair until this past week, now with increased leg swelling has not been able to transfer from bed to chair and has been complaining of leg pain and swelling. son brought pt in to evaluation for issue. No SOB or complaints of chest pain.

## 2017-07-31 NOTE — ED ADULT NURSE NOTE - CHPI ED SYMPTOMS NEG
no fever/no vomiting/no dizziness/no numbness/no decreased eating/drinking/no nausea/no tingling/no chills

## 2017-07-31 NOTE — ED PROVIDER NOTE - MUSCULOSKELETAL, MLM
Spine appears normal, range of motion is not limited, bilateral leg edema 1+, echymosis noted on left heel

## 2017-08-01 DIAGNOSIS — I50.33 ACUTE ON CHRONIC DIASTOLIC (CONGESTIVE) HEART FAILURE: ICD-10-CM

## 2017-08-01 DIAGNOSIS — I10 ESSENTIAL (PRIMARY) HYPERTENSION: ICD-10-CM

## 2017-08-01 DIAGNOSIS — F03.90 UNSPECIFIED DEMENTIA, UNSPECIFIED SEVERITY, WITHOUT BEHAVIORAL DISTURBANCE, PSYCHOTIC DISTURBANCE, MOOD DISTURBANCE, AND ANXIETY: ICD-10-CM

## 2017-08-01 LAB
ANION GAP SERPL CALC-SCNC: 7 MMOL/L — SIGNIFICANT CHANGE UP (ref 5–17)
APPEARANCE UR: CLEAR — SIGNIFICANT CHANGE UP
BILIRUB UR-MCNC: NEGATIVE — SIGNIFICANT CHANGE UP
BUN SERPL-MCNC: 29 MG/DL — HIGH (ref 7–23)
CALCIUM SERPL-MCNC: 9.7 MG/DL — SIGNIFICANT CHANGE UP (ref 8.5–10.1)
CHLORIDE SERPL-SCNC: 105 MMOL/L — SIGNIFICANT CHANGE UP (ref 96–108)
CO2 SERPL-SCNC: 28 MMOL/L — SIGNIFICANT CHANGE UP (ref 22–31)
COLOR SPEC: YELLOW — SIGNIFICANT CHANGE UP
CREAT SERPL-MCNC: 1.66 MG/DL — HIGH (ref 0.5–1.3)
DIFF PNL FLD: ABNORMAL
EPI CELLS # UR: SIGNIFICANT CHANGE UP
GLUCOSE SERPL-MCNC: 90 MG/DL — SIGNIFICANT CHANGE UP (ref 70–99)
GLUCOSE UR QL: NEGATIVE MG/DL — SIGNIFICANT CHANGE UP
HCT VFR BLD CALC: 32.6 % — LOW (ref 34.5–45)
HGB BLD-MCNC: 9.8 G/DL — LOW (ref 11.5–15.5)
KETONES UR-MCNC: NEGATIVE — SIGNIFICANT CHANGE UP
LEUKOCYTE ESTERASE UR-ACNC: ABNORMAL
MCHC RBC-ENTMCNC: 29.9 PG — SIGNIFICANT CHANGE UP (ref 27–34)
MCHC RBC-ENTMCNC: 30.1 GM/DL — LOW (ref 32–36)
MCV RBC AUTO: 99.2 FL — SIGNIFICANT CHANGE UP (ref 80–100)
NITRITE UR-MCNC: NEGATIVE — SIGNIFICANT CHANGE UP
PH UR: 6 — SIGNIFICANT CHANGE UP (ref 5–8)
PLATELET # BLD AUTO: 256 K/UL — SIGNIFICANT CHANGE UP (ref 150–400)
POTASSIUM SERPL-MCNC: 4.6 MMOL/L — SIGNIFICANT CHANGE UP (ref 3.5–5.3)
POTASSIUM SERPL-SCNC: 4.6 MMOL/L — SIGNIFICANT CHANGE UP (ref 3.5–5.3)
PROT UR-MCNC: NEGATIVE MG/DL — SIGNIFICANT CHANGE UP
RBC # BLD: 3.29 M/UL — LOW (ref 3.8–5.2)
RBC # FLD: 14.9 % — SIGNIFICANT CHANGE UP (ref 11–15)
RBC CASTS # UR COMP ASSIST: ABNORMAL /HPF (ref 0–4)
SODIUM SERPL-SCNC: 140 MMOL/L — SIGNIFICANT CHANGE UP (ref 135–145)
SP GR SPEC: 1 — LOW (ref 1.01–1.02)
UROBILINOGEN FLD QL: NEGATIVE MG/DL — SIGNIFICANT CHANGE UP
WBC # BLD: 7.2 K/UL — SIGNIFICANT CHANGE UP (ref 3.8–10.5)
WBC # FLD AUTO: 7.2 K/UL — SIGNIFICANT CHANGE UP (ref 3.8–10.5)
WBC UR QL: ABNORMAL

## 2017-08-01 PROCEDURE — 99233 SBSQ HOSP IP/OBS HIGH 50: CPT

## 2017-08-01 PROCEDURE — 93010 ELECTROCARDIOGRAM REPORT: CPT

## 2017-08-01 RX ORDER — HEPARIN SODIUM 5000 [USP'U]/ML
5000 INJECTION INTRAVENOUS; SUBCUTANEOUS EVERY 12 HOURS
Qty: 0 | Refills: 0 | Status: DISCONTINUED | OUTPATIENT
Start: 2017-08-01 | End: 2017-08-03

## 2017-08-01 RX ORDER — ANASTROZOLE 1 MG/1
1 TABLET ORAL DAILY
Qty: 0 | Refills: 0 | Status: DISCONTINUED | OUTPATIENT
Start: 2017-08-01 | End: 2017-08-03

## 2017-08-01 RX ORDER — DOCUSATE SODIUM 100 MG
100 CAPSULE ORAL THREE TIMES A DAY
Qty: 0 | Refills: 0 | Status: DISCONTINUED | OUTPATIENT
Start: 2017-08-01 | End: 2017-08-03

## 2017-08-01 RX ORDER — LOSARTAN POTASSIUM 100 MG/1
50 TABLET, FILM COATED ORAL DAILY
Qty: 0 | Refills: 0 | Status: DISCONTINUED | OUTPATIENT
Start: 2017-08-01 | End: 2017-08-02

## 2017-08-01 RX ORDER — BUMETANIDE 0.25 MG/ML
1 INJECTION INTRAMUSCULAR; INTRAVENOUS DAILY
Qty: 0 | Refills: 0 | Status: DISCONTINUED | OUTPATIENT
Start: 2017-08-01 | End: 2017-08-02

## 2017-08-01 RX ORDER — MEMANTINE HYDROCHLORIDE 10 MG/1
10 TABLET ORAL DAILY
Qty: 0 | Refills: 0 | Status: DISCONTINUED | OUTPATIENT
Start: 2017-08-01 | End: 2017-08-03

## 2017-08-01 RX ADMIN — LOSARTAN POTASSIUM 50 MILLIGRAM(S): 100 TABLET, FILM COATED ORAL at 11:13

## 2017-08-01 RX ADMIN — HEPARIN SODIUM 5000 UNIT(S): 5000 INJECTION INTRAVENOUS; SUBCUTANEOUS at 17:02

## 2017-08-01 RX ADMIN — Medication 100 MILLIGRAM(S): at 21:24

## 2017-08-01 RX ADMIN — ANASTROZOLE 1 MILLIGRAM(S): 1 TABLET ORAL at 11:13

## 2017-08-01 RX ADMIN — MEMANTINE HYDROCHLORIDE 10 MILLIGRAM(S): 10 TABLET ORAL at 11:13

## 2017-08-01 RX ADMIN — Medication 1 TABLET(S): at 11:13

## 2017-08-01 RX ADMIN — BUMETANIDE 1 MILLIGRAM(S): 0.25 INJECTION INTRAMUSCULAR; INTRAVENOUS at 11:13

## 2017-08-01 RX ADMIN — HEPARIN SODIUM 5000 UNIT(S): 5000 INJECTION INTRAVENOUS; SUBCUTANEOUS at 06:16

## 2017-08-01 NOTE — PHYSICAL THERAPY INITIAL EVALUATION ADULT - ADDITIONAL COMMENTS
As per son, lives in private house. Has hospital bed, patient mechanical  (Arjo/ricardo)  and wheelchair. Has HHA services. Able to transfer from bed to chair c HHA stand pivoting patient. Unable to ambulate at baseline.

## 2017-08-01 NOTE — PHYSICAL THERAPY INITIAL EVALUATION ADULT - ASSISTIVE DEVICE FOR TRANSFER: BED/CHAIR, REHAB EVAL
Active-Assistive Device use c good use of both upper limbs to pull up to stand for standing pivot, demonstrating fair to good lower hip and knee extension (Etac).

## 2017-08-01 NOTE — PHYSICAL THERAPY INITIAL EVALUATION ADULT - GENERAL OBSERVATIONS, REHAB EVAL
Patient supine in bed c cardiac monitor in place. Stable vital signs. AAOx1 (person). Not in apparent pain or distress. Chest Auscultation: diminished breath sounds bibasally c fine crackles to left base. No tactile fremitus.

## 2017-08-01 NOTE — PHYSICAL THERAPY INITIAL EVALUATION ADULT - PLANNED THERAPY INTERVENTIONS, PT EVAL
postural re-education/wheelchair management/propulsion training/balance training/bed mobility training/strengthening/stretching/transfer training/ROM

## 2017-08-01 NOTE — H&P ADULT - HISTORY OF PRESENT ILLNESS
89 year old female with PMH of dementia, HLD, HTN, breast CA, had tib/fib fracture in April after 2 months rehab sent home, has been able to go from bed to wheelchair until this past week, now with increased leg swelling has not been able to transfer from bed to chair and has been complaining of leg pain and swelling. Her son brought pt in to evaluation for issue. No SOB or complaints of chest pain. Pt with dementia, unable to give history. 89 year old female with PMH of dementia, HLD, HTN, CHF, breast CA, had tib/fib fracture in April after 2 months rehab sent home, has been able to go from bed to wheelchair until this past week, now with increased leg swelling has not been able to transfer from bed to chair and has been complaining of leg pain and swelling. Her son brought pt in to evaluation for issue. No SOB or complaints of chest pain. Pt with dementia, unable to give history.

## 2017-08-01 NOTE — CONSULT NOTE ADULT - SUBJECTIVE AND OBJECTIVE BOX
Chief Complaint:  Patient is a 89y old  Female who presents with a chief complaint of leg edema    HPI:   89 year old female with PMH of dementia, HLD, HTN, CHF, breast CA, had tib/fib fracture in April after 2 months rehab sent home, has been able to go from bed to wheelchair until this past week, now with increased leg swelling has not been able to transfer from bed to chair and has been complaining of leg pain and swelling. Her son brought pt in to evaluation for issue. No SOB or complaints of chest pain. Pt with dementia, unable to give history. (01 Aug 2017 00:06)    Allergies and Intolerances:        Allergies:  	No Known Allergies:     Home Medications:   · 	enoxaparin: 40 milligram(s) subcutaneous once a day for 30 days for DVT ppx  · 	docusate sodium 100 mg oral capsule: 1 cap(s) orally 3 times a day  · 	acetaminophen 325 mg oral tablet: 2 tab(s) orally every 6 hours, As needed, Mild Pain (1 - 3)  · 	oxyCODONE 5 mg oral tablet: 1 tab(s) orally every 4 hours, As needed, Moderate Pain (4 - 6)  · 	Uloric 40 mg oral tablet: 1 tab(s) orally once a day  · 	cholestyramine 4 g/5 g oral powder for reconstitution:  orally 3 times a day  · 	anastrozole 1 mg oral tablet: 1 tab(s) orally once a day  · 	multivitamin: 1 tab(s)  once a day  · 	Namenda 10 mg oral tablet: 1 tab(s) orally 2 times a day  · 	bumetanide 1 mg oral tablet: 1 tab(s) orally once a day  · 	Benicar 20 mg oral tablet: 1 tab(s) orally once a day  · 	Zoloft 25 mg oral tablet: 1 tab(s) orally once a day    Past Medical History:  Breast CA    Dementia    Hypertension.    Past Surgical History:  History of knee replacement procedure of left knee    History of knee replacement procedure of right knee.    Family History:  No pertinent family history in first degree relatives.    Social History:  Social History (marital status, living situation, occupation, tobacco use, alcohol and drug use, and sexual history): , lives with family non smoker, non drinker	    Review of Systems:  Unable    Physical Exam:  Vital Signs:  Vital Signs Last 24 Hrs  T(C): 36.8 (01 Aug 2017 12:14), Max: 37.1 (2017 20:38)  T(F): 98.2 (01 Aug 2017 12:14), Max: 98.8 (01 Aug 2017 03:00)  HR: 67 (01 Aug 2017 12:15) (67 - 81)  BP: 135/63 (01 Aug 2017 12:15) (135/63 - 177/80)  RR: 18 (01 Aug 2017 12:14) (18 - 20)  SpO2: 95% (01 Aug 2017 12:15) (95% - 100%)  Daily Height in cm: 154.94 (01 Aug 2017 03:00)    Daily Weight in k.1 (01 Aug 2017 03:00)      General:  Appears stated age, well-groomed, well-nourished, no distress  HEENT:  NC/AT, patent nares w/ pink mucosa, OP clear w/o lesions, EOMI, conjunctivae clear, no thyromegaly, nodules, adenopathy, no JVD  Chest:  Full & symmetric excursion, no increased effort, breath sounds clear  Cardiovascular:  Regular rhythm, S1, S2, no murmur/rub/S3/S4, no carotid/femoral/abdominal bruit, radial/pedal pulses 2+  Abdomen:  Soft, non-tender, non-distended, normoactive bowel sounds  Extremities: no edema  Skin:  No rash/erythema/ecchymoses/petechiae/wounds/abscess/warm/dry  Musculoskeletal:  n/a  Neuro/Psych:  Alert, oriented    Laboratory:                            9.8    7.2   )-----------( 256      ( 01 Aug 2017 06:49 )             32.6     08    140  |  105  |  29<H>  ----------------------------<  90  4.6   |  28  |  1.66<H>    Ca    9.7      01 Aug 2017 06:49    TPro  8.6<H>  /  Alb  3.4  /  TBili  0.3  /  DBili  x   /  AST  15  /  ALT  14  /  AlkPhos  69  07-31      CARDIAC MARKERS ( 2017 19:10 )  <.015 ng/mL / x     / 45 U/L / x     / <0.5 ng/mL      LIVER FUNCTIONS - ( 2017 19:10 )  Alb: 3.4 g/dL / Pro: 8.6 gm/dL / ALK PHOS: 69 U/L / ALT: 14 U/L / AST: 15 U/L / GGT: x           PT/INR - ( 2017 20:40 )   PT: 11.8 sec;   INR: 1.08 ratio         PTT - ( 2017 20:40 )  PTT:24.7 sec  Urinalysis Basic - ( 01 Aug 2017 07:51 )    Color: Yellow / Appearance: Clear / S.005 / pH: x  Gluc: x / Ketone: Negative  / Bili: Negative / Urobili: Negative mg/dL   Blood: x / Protein: Negative mg/dL / Nitrite: Negative   Leuk Esterase: Small / RBC: 3-5 /HPF / WBC 6-10   Sq Epi: x / Non Sq Epi: x / Bacteria: x      Imaging:  ecg:    < from: Xray Chest 1 View AP/PA. (17 @ 20:31) >  IMPRESSION:    Cardiomegaly with mild prominence of interstitial and vascular markings..    < end of copied text >    < from: US Duplex Venous Lower Ext Complete, Bilateral (17 @ 17:26) >    IMPRESSION:     No evidence of acute bilateral lower extremity deep venous thrombosis.  Small chronic nonocclusive thrombus in the right external iliac vein.    < end of copied text >    < from: Transthoracic Echocardiogram (17 @ 13:39) >  Conclusions:  1. Endocardium not well visualized; grossly preserved left  ventricular systolic function, however, endocardial border  definition is limited, and segmental wall-motion  abnormalities cannot be adequately assessed. LV cavity  small. EF 65%  2. The right ventricle is not well visualized; grossly  normal right ventricular systolic function.  3. no  pericardial effusion seen    < end of copied text >    Assessment:  89 year old female with PMH of dementia, HLD, HTN, CHF, breast CA, had tib/fib fracture in April after 2 months rehab sent home, has been able to go from bed to wheelchair until this past week, now with increased leg swelling has not been able to transfer from bed to chair and has been complaining of leg pain and swelling. Her son brought pt in to evaluation for issue. No SOB or complaints of chest pain. Pt with dementia, unable to give history.    Plan:     telemetry monitoring.  Con't with:  MEDICATIONS  (STANDING):  multivitamin 1 Tablet(s) Oral daily  memantine 10 milliGRAM(s) Oral daily  docusate sodium 100 milliGRAM(s) Oral three times a day  buMETAnide 1 milliGRAM(s) Oral daily  anastrozole 1 milliGRAM(s) Oral daily  losartan 50 milliGRAM(s) Oral daily  heparin  Injectable 5000 Unit(s) SubCutaneous every 12 hours    diurese and fluid/salt restrict if possible.    Herb García MD, FACC, PATTI, YUN, FACP  Director, Heart Failure Services  [facility]  , Department of Cardiology  [facility] Chief Complaint:  Patient is a 89y old  Female who presents with a chief complaint of leg edema    HPI:   89 year old female with PMH of dementia, HLD, HTN, CHF, breast CA, had tib/fib fracture in April after 2 months rehab sent home, has been able to go from bed to wheelchair until this past week, now with increased leg swelling has not been able to transfer from bed to chair and has been complaining of leg pain and swelling. Her son brought pt in to evaluation for issue. No SOB or complaints of chest pain. Pt with dementia, unable to give history. (01 Aug 2017 00:06)    Allergies and Intolerances:        Allergies:  	No Known Allergies:     Home Medications:   · 	enoxaparin: 40 milligram(s) subcutaneous once a day for 30 days for DVT ppx  · 	docusate sodium 100 mg oral capsule: 1 cap(s) orally 3 times a day  · 	acetaminophen 325 mg oral tablet: 2 tab(s) orally every 6 hours, As needed, Mild Pain (1 - 3)  · 	oxyCODONE 5 mg oral tablet: 1 tab(s) orally every 4 hours, As needed, Moderate Pain (4 - 6)  · 	Uloric 40 mg oral tablet: 1 tab(s) orally once a day  · 	cholestyramine 4 g/5 g oral powder for reconstitution:  orally 3 times a day  · 	anastrozole 1 mg oral tablet: 1 tab(s) orally once a day  · 	multivitamin: 1 tab(s)  once a day  · 	Namenda 10 mg oral tablet: 1 tab(s) orally 2 times a day  · 	bumetanide 1 mg oral tablet: 1 tab(s) orally once a day  · 	Benicar 20 mg oral tablet: 1 tab(s) orally once a day  · 	Zoloft 25 mg oral tablet: 1 tab(s) orally once a day    Past Medical History:  Breast CA    Dementia    Hypertension.    Past Surgical History:  History of knee replacement procedure of left knee    History of knee replacement procedure of right knee.    Family History:  No pertinent family history in first degree relatives.    Social History:  Social History (marital status, living situation, occupation, tobacco use, alcohol and drug use, and sexual history): , lives with family non smoker, non drinker	    Review of Systems:  Unable    Physical Exam:  Vital Signs:  Vital Signs Last 24 Hrs  T(C): 36.8 (01 Aug 2017 12:14), Max: 37.1 (2017 20:38)  T(F): 98.2 (01 Aug 2017 12:14), Max: 98.8 (01 Aug 2017 03:00)  HR: 67 (01 Aug 2017 12:15) (67 - 81)  BP: 135/63 (01 Aug 2017 12:15) (135/63 - 177/80)  RR: 18 (01 Aug 2017 12:14) (18 - 20)  SpO2: 95% (01 Aug 2017 12:15) (95% - 100%)  Daily Height in cm: 154.94 (01 Aug 2017 03:00)    Daily Weight in k.1 (01 Aug 2017 03:00)    Tele: SR  General:  Appears stated age, well-groomed, well-nourished, no distress  HEENT:  NC/AT, patent nares w/ pink mucosa, OP clear w/o lesions, EOMI, conjunctivae clear, no thyromegaly, nodules, adenopathy, no JVD  Chest:  Full & symmetric excursion, no increased effort, breath sounds clear  Cardiovascular:  Regular rhythm, S1, S2, no murmur/rub/S3/S4, no carotid/femoral/abdominal bruit, radial/pedal pulses 2+  Abdomen:  Soft, non-tender, non-distended, normoactive bowel sounds  Extremities: no edema  Skin:  No rash/erythema/ecchymoses/petechiae/wounds/abscess/warm/dry  Musculoskeletal:  n/a  Neuro/Psych:  Alert, oriented    Laboratory:                            9.8    7.2   )-----------( 256      ( 01 Aug 2017 06:49 )             32.6     08-    140  |  105  |  29<H>  ----------------------------<  90  4.6   |  28  |  1.66<H>    Ca    9.7      01 Aug 2017 06:49    TPro  8.6<H>  /  Alb  3.4  /  TBili  0.3  /  DBili  x   /  AST  15  /  ALT  14  /  AlkPhos  69  07-31      CARDIAC MARKERS ( 2017 19:10 )  <.015 ng/mL / x     / 45 U/L / x     / <0.5 ng/mL      LIVER FUNCTIONS - ( 2017 19:10 )  Alb: 3.4 g/dL / Pro: 8.6 gm/dL / ALK PHOS: 69 U/L / ALT: 14 U/L / AST: 15 U/L / GGT: x           PT/INR - ( 2017 20:40 )   PT: 11.8 sec;   INR: 1.08 ratio         PTT - ( 2017 20:40 )  PTT:24.7 sec  Urinalysis Basic - ( 01 Aug 2017 07:51 )    Color: Yellow / Appearance: Clear / S.005 / pH: x  Gluc: x / Ketone: Negative  / Bili: Negative / Urobili: Negative mg/dL   Blood: x / Protein: Negative mg/dL / Nitrite: Negative   Leuk Esterase: Small / RBC: 3-5 /HPF / WBC 6-10   Sq Epi: x / Non Sq Epi: x / Bacteria: x      Imaging:  ecg: SR      < from: Xray Chest 1 View AP/PA. (17 @ 20:31) >  IMPRESSION:    Cardiomegaly with mild prominence of interstitial and vascular markings..    < end of copied text >    < from: US Duplex Venous Lower Ext Complete, Bilateral (17 @ 17:26) >    IMPRESSION:     No evidence of acute bilateral lower extremity deep venous thrombosis.  Small chronic nonocclusive thrombus in the right external iliac vein.    < end of copied text >    < from: Transthoracic Echocardiogram (17 @ 13:39) >  Conclusions:  1. Endocardium not well visualized; grossly preserved left  ventricular systolic function, however, endocardial border  definition is limited, and segmental wall-motion  abnormalities cannot be adequately assessed. LV cavity  small. EF 65%  2. The right ventricle is not well visualized; grossly  normal right ventricular systolic function.  3. no  pericardial effusion seen    < end of copied text >    Assessment:  89 year old female with PMH of dementia, HLD, HTN, CHF, breast CA, had tib/fib fracture in April after 2 months rehab sent home, has been able to go from bed to wheelchair until this past week, now with increased leg swelling has not been able to transfer from bed to chair and has been complaining of leg pain and swelling. Her son brought pt in to evaluation for issue. No SOB or complaints of chest pain. Pt with dementia, unable to give history.    Plan:     telemetry monitoring.  Con't with:  MEDICATIONS  (STANDING):  multivitamin 1 Tablet(s) Oral daily  memantine 10 milliGRAM(s) Oral daily  docusate sodium 100 milliGRAM(s) Oral three times a day  buMETAnide 1 milliGRAM(s) Oral daily  anastrozole 1 milliGRAM(s) Oral daily  losartan 50 milliGRAM(s) Oral daily  heparin  Injectable 5000 Unit(s) SubCutaneous every 12 hours    diurese and fluid/salt restrict if possible.    Herb García MD, FACC, FASKEENAN, YUN, FACP  Director, Heart Failure Services  [facility]  , Department of Cardiology  [facility]

## 2017-08-01 NOTE — H&P ADULT - ASSESSMENT
89 y/o woman,  PMH HTN, CHF, recent  development of pedal edema, no CP or SOB. Pt on home diuretics possible non compliance. Previous echo showed EF greater than 60%.  possible diastolic heart failure.

## 2017-08-01 NOTE — H&P ADULT - NSHPLABSRESULTS_GEN_ALL_CORE
LABS:                        9.7    9.5   )-----------( 260      ( 31 Jul 2017 20:44 )             31.3     07-31    140  |  105  |  32<H>  ----------------------------<  105<H>  5.0   |  27  |  1.94<H>    Ca    9.5      31 Jul 2017 19:10    TPro  8.6<H>  /  Alb  3.4  /  TBili  0.3  /  DBili  x   /  AST  15  /  ALT  14  /  AlkPhos  69  07-31    PT/INR - ( 31 Jul 2017 20:40 )   PT: 11.8 sec;   INR: 1.08 ratio         PTT - ( 31 Jul 2017 20:40 )  PTT:24.7 sec    CAPILLARY BLOOD GLUCOSE        RADIOLOGY & ADDITIONAL TESTS:    Imaging Personally Reviewed:  [ ] YES  [ ] NO LABS:                        9.7    9.5   )-----------( 260      ( 31 Jul 2017 20:44 )             31.3     07-31    140  |  105  |  32<H>  ----------------------------<  105<H>  5.0   |  27  |  1.94<H>    Ca    9.5      31 Jul 2017 19:10    TPro  8.6<H>  /  Alb  3.4  /  TBili  0.3  /  DBili  x   /  AST  15  /  ALT  14  /  AlkPhos  69  07-31    PT/INR - ( 31 Jul 2017 20:40 )   PT: 11.8 sec;   INR: 1.08 ratio         PTT - ( 31 Jul 2017 20:40 )  PTT:24.7 sec    CAPILLARY BLOOD GLUCOSE        RADIOLOGY & ADDITIONAL TESTS:  CXR: PVC  Imaging Personally Reviewed:  [ x] YES  [ ] NO    EKG: sinus@71, non spec ST T changes

## 2017-08-01 NOTE — SWALLOW BEDSIDE ASSESSMENT ADULT - MODE OF PRESENTATION
self fed/fed by clinician/spoon cup/self fed self fed/cup fed by clinician/self fed fed by clinician/cup/self fed

## 2017-08-01 NOTE — SWALLOW BEDSIDE ASSESSMENT ADULT - SWALLOW EVAL: DIAGNOSIS
Pt presented with oropharyngeal phases of swallow grossly within functional limits, no overt signs of aspiration at this time.

## 2017-08-01 NOTE — SWALLOW BEDSIDE ASSESSMENT ADULT - SWALLOW EVAL: RECOMMENDED FEEDING/EATING TECHNIQUES
allow for swallow between intakes/position upright (90 degrees)/single sips at a time of thin liquids/maintain upright posture during/after eating for 30 mins position upright (90 degrees)/maintain upright posture during/after eating for 30 mins/recommend small single sip of liquid via cup/allow for swallow between intakes

## 2017-08-01 NOTE — PROGRESS NOTE ADULT - ASSESSMENT
89 y/o woman,  PMH HTN, CHF, recent  development of pedal edema, no CP or SOB. Pt on home diuretics possible non compliance. Previous echo showed EF greater than 60%.  possible diastolic heart failure.

## 2017-08-01 NOTE — SWALLOW BEDSIDE ASSESSMENT ADULT - SLP GENERAL OBSERVATIONS
Pt seen bedside, alert and oriented x1. Pt responded to all presented questions and frequently initiated verbal communication however noted consistent confusion ?2/2 to dementia. Pt inconsistently followed directions and benefitted from visual models (e.g, to open mouth during oral peripheral examination). Pt leaning to right side throughout evaluation and required physical prompts to reposition. Pt seen bedside, alert and oriented x1. Pt responded to all presented questions and frequently initiated verbal communication however noted confusion ?2/2 to dementia. Pt inconsistently followed directions and benefitted from visual models (e.g, to open mouth during oral peripheral examination). Pt leaning to right side throughout evaluation and required physical prompts to reposition.

## 2017-08-01 NOTE — SWALLOW BEDSIDE ASSESSMENT ADULT - COMMENTS
Noted delayed throat clear post sequential swallows of thin liquid. Noted delayed throat clear 1x post sequential swallows of thin liquid.

## 2017-08-01 NOTE — SWALLOW BEDSIDE ASSESSMENT ADULT - H & P REVIEW
yes/89 year old female with PMH of dementia, HLD, HTN, CHF, breast CA, had tib/fib fracture in April after 2 months rehab sent home, has been able to go from bed to wheelchair until this past week, now with increased leg swelling has not been able to transfer from bed to chair and has been complaining of leg pain and swelling. Her son brought pt in to evaluation for issue. No SOB or complaints of chest pain. Pt with dementia, unable to give history.

## 2017-08-01 NOTE — PHYSICAL THERAPY INITIAL EVALUATION ADULT - MODIFIED CLINICAL TEST OF SENSORY INTEGRATION IN BALANCE TEST
Barthel Index: Feeding Score _10__, Bathing Score _0__, Grooming Score _0__, Dressing Score _0__, Bowels Score _0__, Bladder Score _0_, Toilet Score _0__, Transfers Score _5__, Mobility Score _0__, Stairs Score _0 __,     Total Score __15_

## 2017-08-01 NOTE — PHYSICAL THERAPY INITIAL EVALUATION ADULT - CRITERIA FOR SKILLED THERAPEUTIC INTERVENTIONS
anticipated discharge recommendation/predicted duration of therapy intervention/rehab potential/functional limitations in following categories/therapy frequency/risk reduction/prevention/impairments found

## 2017-08-01 NOTE — PHYSICAL THERAPY INITIAL EVALUATION ADULT - IMPAIRMENTS FOUND, PT EVAL
cognitive impairment/integumentary integrity/joint integrity and mobility/neuromotor development and sensory integration/ROM/gait, locomotion, and balance/muscle strength/poor safety awareness/ventilation and respiration/gas exchange/circulation/ergonomics and body mechanics

## 2017-08-01 NOTE — H&P ADULT - NSHPPHYSICALEXAM_GEN_ALL_CORE
T(C): 37.1 (31 Jul 2017 20:38), Max: 37.1 (31 Jul 2017 20:38)  T(F): 98.7 (31 Jul 2017 20:38), Max: 98.7 (31 Jul 2017 20:38)  HR: 81 (31 Jul 2017 20:38) (78 - 81)  BP: 159/82 (31 Jul 2017 20:38) (135/68 - 159/82)  BP(mean): --  RR: 18 (31 Jul 2017 20:38) (18 - 18)  SpO2: 97% (31 Jul 2017 20:38) (97% - 97%)    PHYSICAL EXAM:  GENERAL: NAD, well-groomed, well-developed  HEAD:  Atraumatic, Normocephalic  EYES: EOMI, PERRLA, conjunctiva and sclera clear  ENMT: No tonsillar erythema, exudates, or enlargement; Moist mucous membranes, No lesions  NECK: Supple, No JVD, Normal thyroid  NERVOUS SYSTEM:  Alert & Oriented X2, CN 2-12 intact; Motor Sensory intact  CHEST/LUNG: Clear to percussion bilaterally; No rales, rhonchi, wheezing, or rubs  HEART: Regular rate and rhythm; No murmurs, rubs, or gallops  ABDOMEN: Soft, Nontender, Nondistended; Bowel sounds present  EXTREMITIES: + Peripheral Pulses, No clubbing, cyanosis, 1+ edema, left heel, black blister no drainage.  LYMPH: No lymphadenopathy noted  SKIN: No rashes or lesions

## 2017-08-01 NOTE — PHYSICAL THERAPY INITIAL EVALUATION ADULT - PERTINENT HX OF CURRENT PROBLEM, REHAB EVAL
Patient brought in from home due to son noticing she has been unable to transfer out of bed to chair as usual c complains of leg pain. PT wound care initial evaluation performed with all wounds documented in flowsheet 2 4.0 A&I. Chart reviewed and noted CT head not showing acute neurologic changes; US doppler without signs of DVT to lower limbs; slightly elevated APTT.

## 2017-08-02 LAB
ALBUMIN SERPL ELPH-MCNC: 3.5 G/DL — SIGNIFICANT CHANGE UP (ref 3.3–5)
ALP SERPL-CCNC: 69 U/L — SIGNIFICANT CHANGE UP (ref 40–120)
ALT FLD-CCNC: 17 U/L — SIGNIFICANT CHANGE UP (ref 12–78)
ANION GAP SERPL CALC-SCNC: 8 MMOL/L — SIGNIFICANT CHANGE UP (ref 5–17)
AST SERPL-CCNC: 21 U/L — SIGNIFICANT CHANGE UP (ref 15–37)
BILIRUB SERPL-MCNC: 0.4 MG/DL — SIGNIFICANT CHANGE UP (ref 0.2–1.2)
BUN SERPL-MCNC: 28 MG/DL — HIGH (ref 7–23)
CALCIUM SERPL-MCNC: 9.9 MG/DL — SIGNIFICANT CHANGE UP (ref 8.5–10.1)
CHLORIDE SERPL-SCNC: 102 MMOL/L — SIGNIFICANT CHANGE UP (ref 96–108)
CO2 SERPL-SCNC: 29 MMOL/L — SIGNIFICANT CHANGE UP (ref 22–31)
CREAT SERPL-MCNC: 1.73 MG/DL — HIGH (ref 0.5–1.3)
GLUCOSE SERPL-MCNC: 96 MG/DL — SIGNIFICANT CHANGE UP (ref 70–99)
HCT VFR BLD CALC: 34.1 % — LOW (ref 34.5–45)
HGB BLD-MCNC: 10.4 G/DL — LOW (ref 11.5–15.5)
MAGNESIUM SERPL-MCNC: 2.1 MG/DL — SIGNIFICANT CHANGE UP (ref 1.6–2.6)
MCHC RBC-ENTMCNC: 30.1 PG — SIGNIFICANT CHANGE UP (ref 27–34)
MCHC RBC-ENTMCNC: 30.3 GM/DL — LOW (ref 32–36)
MCV RBC AUTO: 99.3 FL — SIGNIFICANT CHANGE UP (ref 80–100)
PHOSPHATE SERPL-MCNC: 3.8 MG/DL — SIGNIFICANT CHANGE UP (ref 2.5–4.5)
PLATELET # BLD AUTO: 251 K/UL — SIGNIFICANT CHANGE UP (ref 150–400)
POTASSIUM SERPL-MCNC: 4.6 MMOL/L — SIGNIFICANT CHANGE UP (ref 3.5–5.3)
POTASSIUM SERPL-SCNC: 4.6 MMOL/L — SIGNIFICANT CHANGE UP (ref 3.5–5.3)
PROT SERPL-MCNC: 9 GM/DL — HIGH (ref 6–8.3)
RBC # BLD: 3.44 M/UL — LOW (ref 3.8–5.2)
RBC # FLD: 14.5 % — SIGNIFICANT CHANGE UP (ref 11–15)
SODIUM SERPL-SCNC: 139 MMOL/L — SIGNIFICANT CHANGE UP (ref 135–145)
WBC # BLD: 7.5 K/UL — SIGNIFICANT CHANGE UP (ref 3.8–10.5)
WBC # FLD AUTO: 7.5 K/UL — SIGNIFICANT CHANGE UP (ref 3.8–10.5)

## 2017-08-02 PROCEDURE — 73560 X-RAY EXAM OF KNEE 1 OR 2: CPT | Mod: 26,50

## 2017-08-02 PROCEDURE — 74000: CPT | Mod: 26

## 2017-08-02 PROCEDURE — 99233 SBSQ HOSP IP/OBS HIGH 50: CPT

## 2017-08-02 RX ORDER — SIMETHICONE 80 MG/1
80 TABLET, CHEWABLE ORAL ONCE
Qty: 0 | Refills: 0 | Status: COMPLETED | OUTPATIENT
Start: 2017-08-02 | End: 2017-08-02

## 2017-08-02 RX ORDER — LOSARTAN POTASSIUM 100 MG/1
100 TABLET, FILM COATED ORAL DAILY
Qty: 0 | Refills: 0 | Status: DISCONTINUED | OUTPATIENT
Start: 2017-08-02 | End: 2017-08-03

## 2017-08-02 RX ADMIN — MEMANTINE HYDROCHLORIDE 10 MILLIGRAM(S): 10 TABLET ORAL at 11:35

## 2017-08-02 RX ADMIN — Medication 100 MILLIGRAM(S): at 05:53

## 2017-08-02 RX ADMIN — ANASTROZOLE 1 MILLIGRAM(S): 1 TABLET ORAL at 11:36

## 2017-08-02 RX ADMIN — SIMETHICONE 80 MILLIGRAM(S): 80 TABLET, CHEWABLE ORAL at 06:19

## 2017-08-02 RX ADMIN — HEPARIN SODIUM 5000 UNIT(S): 5000 INJECTION INTRAVENOUS; SUBCUTANEOUS at 17:09

## 2017-08-02 RX ADMIN — BUMETANIDE 1 MILLIGRAM(S): 0.25 INJECTION INTRAMUSCULAR; INTRAVENOUS at 05:46

## 2017-08-02 RX ADMIN — Medication 1 TABLET(S): at 11:35

## 2017-08-02 RX ADMIN — HEPARIN SODIUM 5000 UNIT(S): 5000 INJECTION INTRAVENOUS; SUBCUTANEOUS at 05:46

## 2017-08-02 RX ADMIN — LOSARTAN POTASSIUM 50 MILLIGRAM(S): 100 TABLET, FILM COATED ORAL at 05:46

## 2017-08-02 NOTE — PROGRESS NOTE ADULT - ATTENDING COMMENTS
Continue conservative approach. Continue conservative approach.No significant weight loss noted. Likely chronic lymphedema - encourage leg elevation and compression stockings.

## 2017-08-02 NOTE — DIETITIAN INITIAL EVALUATION ADULT. - PERTINENT LABORATORY DATA
08-02 Na139 mmol/L Glu 96 mg/dL K+ 4.6 mmol/L Cr  1.73 mg/dL<H> BUN 28 mg/dL<H> Phos 3.8 mg/dL Alb 3.5 g/dL PAB n/a, GFR 29L

## 2017-08-02 NOTE — CHART NOTE - NSCHARTNOTEFT_GEN_A_CORE
Upon Nutritional Assessment by the Registered Dietitian your patient was determined to meet criteria / has evidence of the following diagnosis/diagnoses:          [ ]  Mild Protein Calorie Malnutrition        [ ]  Moderate Protein Calorie Malnutrition        [ ] Severe Protein Calorie Malnutrition        [ ] Unspecified Protein Calorie Malnutrition        [ ] Underweight / BMI <19        [X ] Morbid Obesity / BMI > 40      Findings as based on:  •  Comprehensive nutrition assessment and consultation  •  Calorie counts (nutrient intake analysis)  •  Food acceptance and intake status from observations by staff  •  Follow up  •  Patient education  •  Intervention secondary to interdisciplinary rounds  •   concerns      Treatment:    The following diet has been recommended: Continue DASH diet      PROVIDER Section:     By signing this assessment you are acknowledging and agree with the diagnosis/diagnoses assigned by the Registered Dietitian    Comments:

## 2017-08-02 NOTE — PROGRESS NOTE ADULT - ASSESSMENT
89 year old female with PMH of dementia, HLD, HTN, CHF, breast CA, had tib/fib fracture in April after 2 months rehab sent home, has been able to go from bed to wheelchair until this past week, now with increased leg swelling has not been able to transfer from bed to chair and has been complaining of leg pain and swelling. Her son brought pt in to evaluation for issue. No SOB or complaints of chest pain. Pt with dementia, unable to give history.    No evidence of acute CHF. Most likely dependent edema of LE.  Continue diuretic and salt restrictions.  PT caroline.  Radhames DC Tele.

## 2017-08-02 NOTE — PROGRESS NOTE ADULT - SUBJECTIVE AND OBJECTIVE BOX
Patient is a 89y old  Female who presents with a chief complaint of     HPI:   89 year old female with PMH of dementia, HLD, HTN, CHF, breast CA, had tib/fib fracture in April after 2 months rehab sent home, has been able to go from bed to wheelchair until this past week, now with increased leg swelling has not been able to transfer from bed to chair and has been complaining of leg pain and swelling. Her son brought pt in to evaluation for issue. No SOB or complaints of chest pain. Pt with dementia, unable to give history.    PAST MEDICAL & SURGICAL HISTORY:  Breast CA  Dementia  Hypertension  History of knee replacement procedure of left knee  History of knee replacement procedure of right knee      INTERVAL HISTORY: Alert, dementia, currently no compliants  	  MEDICATIONS:  MEDICATIONS  (STANDING):  multivitamin 1 Tablet(s) Oral daily  memantine 10 milliGRAM(s) Oral daily  docusate sodium 100 milliGRAM(s) Oral three times a day  buMETAnide 1 milliGRAM(s) Oral daily  anastrozole 1 milliGRAM(s) Oral daily  losartan 50 milliGRAM(s) Oral daily  heparin  Injectable 5000 Unit(s) SubCutaneous every 12 hours    MEDICATIONS  (PRN):      Vitals:  T(F): 97.4 (08-02-17 @ 04:50), Max: 99.8 (08-02-17 @ 00:26)  HR: 77 (08-02-17 @ 04:50) (66 - 77)  BP: 122/88 (08-02-17 @ 04:50) (122/88 - 160/80)  RR: 20 (08-02-17 @ 04:50) (16 - 20)  SpO2: 95% (08-02-17 @ 04:50) (95% - 100%)  Wt(kg): --        PHYSICAL EXAM:  Neuro: Awake, responsive  CV: S1 S2 RRR  Lungs: CTABL  GI: Soft, BS +, ND, NT  Extremities: No edema    Imaging:  ecg: SR      < from: Xray Chest 1 View AP/PA. (07.31.17 @ 20:31) >  IMPRESSION:    Cardiomegaly with mild prominence of interstitial and vascular markings..    < end of copied text >    < from: US Duplex Venous Lower Ext Complete, Bilateral (07.31.17 @ 17:26) >    IMPRESSION:     No evidence of acute bilateral lower extremity deep venous thrombosis.  Small chronic nonocclusive thrombus in the right external iliac vein.    < end of copied text >    < from: Transthoracic Echocardiogram (05.08.17 @ 13:39) >  Conclusions:  1. Endocardium not well visualized; grossly preserved left  ventricular systolic function, however, endocardial border  definition is limited, and segmental wall-motion  abnormalities cannot be adequately assessed. LV cavity  small. EF 65%  2. The right ventricle is not well visualized; grossly  normal right ventricular systolic function.  3. no  pericardial effusion seen    < end of copied text >    TELEMETRY: NSR 80-90's  	  LABS:	 	    CARDIAC MARKERS:  Troponin I, Serum: <.015 ng/mL (07-31 @ 19:10)                          10.4   7.5   )-----------( 251      ( 02 Aug 2017 09:28 )             34.1 ,                       9.8    7.2   )-----------( 256      ( 01 Aug 2017 06:49 )             32.6 ,                       9.7    9.5   )-----------( 260      ( 31 Jul 2017 20:44 )             31.3 ,                       se note  see note )-----------( see note    ( 31 Jul 2017 19:10 )             see note  08-02    139  |  102  |  28<H>  ----------------------------<  96  4.6   |  29  |  1.73<H>    Ca    9.9      02 Aug 2017 09:28  Phos  3.8     08-02  Mg     2.1     08-02    TPro  9.0<H>  /  Alb  3.5  /  TBili  0.4  /  DBili  x   /  AST  21  /  ALT  17  /  AlkPhos  69  08-02    proBNP: Serum Pro-Brain Natriuretic Peptide: 931 pg/mL (07-31 @ 19:10)
Patient is a 89y old  Female who presents with a chief complaint of     INTERVAL HPI/OVERNIGHT EVENTS:    MEDICATIONS  (STANDING):  multivitamin 1 Tablet(s) Oral daily  memantine 10 milliGRAM(s) Oral daily  docusate sodium 100 milliGRAM(s) Oral three times a day  buMETAnide 1 milliGRAM(s) Oral daily  anastrozole 1 milliGRAM(s) Oral daily  losartan 50 milliGRAM(s) Oral daily  heparin  Injectable 5000 Unit(s) SubCutaneous every 12 hours    MEDICATIONS  (PRN):      Allergies    No Known Allergies    Intolerances        REVIEW OF SYSTEMS:  CONSTITUTIONAL: No fever, weight loss, or fatigue  EYES: No eye pain, visual disturbances, or discharge  ENMT:  No difficulty hearing, tinnitus, vertigo; No sinus or throat pain  NECK: No pain or stiffness  BREASTS: No pain, masses, or nipple discharge  RESPIRATORY: No cough, wheezing, chills or hemoptysis; No shortness of breath  CARDIOVASCULAR: No chest pain, palpitations, dizziness, or mild  swelling  GASTROINTESTINAL: No abdominal or epigastric pain. No nausea, vomiting, or hematemesis; No diarrhea or constipation. No melena or hematochezia.  GENITOURINARY: No dysuria, frequency, hematuria, or incontinence  NEUROLOGICAL: No headaches, memory loss, loss of strength, numbness, or tremors  SKIN: No itching, burning, rashes, or lesions   LYMPH NODES: No enlarged glands  ENDOCRINE: No heat or cold intolerance; No hair loss  MUSCULOSKELETAL: b/l knee pain   PSYCHIATRIC: No depression, anxiety, mood swings, or difficulty sleeping  HEME/LYMPH: No easy bruising, or bleeding gums  ALLERGY AND IMMUNOLOGIC: No hives or eczema    Vital Signs Last 24 Hrs  T(C): 37 (01 Aug 2017 16:53), Max: 37.1 (2017 20:38)  T(F): 98.6 (01 Aug 2017 16:53), Max: 98.8 (01 Aug 2017 03:00)  HR: 66 (01 Aug 2017 16:53) (66 - 81)  BP: 160/80 (01 Aug 2017 16:53) (135/63 - 177/80)  BP(mean): --  RR: 20 (01 Aug 2017 16:53) (18 - 20)  SpO2: 98% (01 Aug 2017 16:53) (95% - 100%)    PHYSICAL EXAM:  GENERAL: NAD, well-groomed, well-developed  HEAD:  Atraumatic, Normocephalic  EYES: EOMI, PERRLA, conjunctiva and sclera clear  ENMT: No tonsillar erythema, exudates, or enlargement; Moist mucous membranes, Good dentition, No lesions  NECK: Supple, No JVD, Normal thyroid  NERVOUS SYSTEM:  Alert & Oriented X3, Good concentration; Motor Strength 5/5 B/L upper and lower extremities; DTRs 2+ intact and symmetric  CHEST/LUNG: Clear to percussion bilaterally; No rales, rhonchi, wheezing, or rubs  HEART: Regular rate and rhythm; No murmurs, rubs, or gallops  ABDOMEN: Soft, Nontender, Nondistended; Bowel sounds present  EXTREMITIES:  2+ Peripheral Pulses, No clubbing, cyanosis, mild edema b/l knee scars edema  LYMPH: No lymphadenopathy noted  SKIN: No rashes or lesions    LABS:                        9.8    7.2   )-----------( 256      ( 01 Aug 2017 06:49 )             32.6     08    140  |  105  |  29<H>  ----------------------------<  90  4.6   |  28  |  1.66<H>    Ca    9.7      01 Aug 2017 06:49    TPro  8.6<H>  /  Alb  3.4  /  TBili  0.3  /  DBili  x   /  AST  15  /  ALT  14  /  AlkPhos  69  07-31    PT/INR - ( 2017 20:40 )   PT: 11.8 sec;   INR: 1.08 ratio         PTT - ( 2017 20:40 )  PTT:24.7 sec  Urinalysis Basic - ( 01 Aug 2017 07:51 )    Color: Yellow / Appearance: Clear / S.005 / pH: x  Gluc: x / Ketone: Negative  / Bili: Negative / Urobili: Negative mg/dL   Blood: x / Protein: Negative mg/dL / Nitrite: Negative   Leuk Esterase: Small / RBC: 3-5 /HPF / WBC 6-10   Sq Epi: x / Non Sq Epi: x / Bacteria: x      CAPILLARY BLOOD GLUCOSE          RADIOLOGY & ADDITIONAL TESTS:    Imaging Personally Reviewed:  [ X] YES  [ ] NO    Consultant(s) Notes Reviewed:  [X ] YES  [ ] NO    Care Discussed with Consultants/Other Providers [ X] YES  [ ] NO
Patient is a 89y old  Female who presents with a chief complaint of leg swelling     INTERVAL HPI/OVERNIGHT EVENTS:no acute events overnight     MEDICATIONS  (STANDING):  multivitamin 1 Tablet(s) Oral daily  memantine 10 milliGRAM(s) Oral daily  docusate sodium 100 milliGRAM(s) Oral three times a day  anastrozole 1 milliGRAM(s) Oral daily  heparin  Injectable 5000 Unit(s) SubCutaneous every 12 hours  losartan 100 milliGRAM(s) Oral daily    MEDICATIONS  (PRN):      Allergies    No Known Allergies    Intolerances        REVIEW OF SYSTEMS:  CONSTITUTIONAL: No fever, weight loss, or fatigue  EYES: No eye pain, visual disturbances, or discharge  ENMT:  No difficulty hearing, tinnitus, vertigo; No sinus or throat pain  NECK: No pain or stiffness  BREASTS: No pain, masses, or nipple discharge  RESPIRATORY: No cough, wheezing, chills or hemoptysis; No shortness of breath  CARDIOVASCULAR: No chest pain, palpitations, dizziness, or leg swelling  GASTROINTESTINAL: No abdominal or epigastric pain. No nausea, vomiting, or hematemesis; No diarrhea or constipation. No melena or hematochezia.  GENITOURINARY: No dysuria, frequency, hematuria, or incontinence  NEUROLOGICAL: No headaches, memory loss, loss of strength, numbness, or tremors  SKIN: No itching, burning, rashes, or lesions   LYMPH NODES: No enlarged glands  ENDOCRINE: No heat or cold intolerance; No hair loss  MUSCULOSKELETAL: joint pain and leg swelling   PSYCHIATRIC: No depression, anxiety, mood swings, or difficulty sleeping  HEME/LYMPH: No easy bruising, or bleeding gums  ALLERGY AND IMMUNOLOGIC: No hives or eczema    Vital Signs Last 24 Hrs  T(C): 36.6 (02 Aug 2017 13:29), Max: 37.7 (02 Aug 2017 00:26)  T(F): 97.8 (02 Aug 2017 13:29), Max: 99.8 (02 Aug 2017 00:26)  HR: 86 (02 Aug 2017 13:29) (66 - 86)  BP: 164/94 (02 Aug 2017 13:29) (122/88 - 164/94)  BP(mean): --  RR: 18 (02 Aug 2017 13:29) (16 - 20)  SpO2: 98% (02 Aug 2017 13:29) (95% - 98%)    PHYSICAL EXAM:  GENERAL: NAD, well-groomed, well-developed  HEAD:  Atraumatic, Normocephalic  EYES: EOMI, PERRLA, conjunctiva and sclera clear  ENMT: No tonsillar erythema, exudates, or enlargement; Moist mucous membranes, Good dentition, No lesions  NECK: Supple, No JVD, Normal thyroid  NERVOUS SYSTEM:  Alert & Oriented X3, Good concentration; Motor Strength 5/5 B/L upper and lower extremities; DTRs 2+ intact and symmetric  CHEST/LUNG: Clear to percussion bilaterally; No rales, rhonchi, wheezing, or rubs  HEART: Regular rate and rhythm; No murmurs, rubs, or gallops  ABDOMEN: Soft, Nontender, Nondistended; Bowel sounds present  EXTREMITIES:  2+ Peripheral Pulses, No clubbing, cyanosis, or edema  LYMPH: No lymphadenopathy noted  SKIN: No rashes or lesions    LABS:                        10.4   7.5   )-----------( 251      ( 02 Aug 2017 09:28 )             34.1     08-    139  |  102  |  28<H>  ----------------------------<  96  4.6   |  29  |  1.73<H>    Ca    9.9      02 Aug 2017 09:28  Phos  3.8     08-  Mg     2.1     -    TPro  9.0<H>  /  Alb  3.5  /  TBili  0.4  /  DBili  x   /  AST  21  /  ALT  17  /  AlkPhos  69  08-02    PT/INR - ( 2017 20:40 )   PT: 11.8 sec;   INR: 1.08 ratio         PTT - ( 2017 20:40 )  PTT:24.7 sec  Urinalysis Basic - ( 01 Aug 2017 07:51 )    Color: Yellow / Appearance: Clear / S.005 / pH: x  Gluc: x / Ketone: Negative  / Bili: Negative / Urobili: Negative mg/dL   Blood: x / Protein: Negative mg/dL / Nitrite: Negative   Leuk Esterase: Small / RBC: 3-5 /HPF / WBC 6-10   Sq Epi: x / Non Sq Epi: x / Bacteria: x      CAPILLARY BLOOD GLUCOSE          RADIOLOGY & ADDITIONAL TESTS:  < from: Xray Knee 1 or 2 Views, Bilateral (17 @ 08:37) >  MPRESSION:   1. Right knee arthroplasty in situ - no new fracture-subluxation; no   periprosthetic fracture.  2. Left knee arthroplasty and distal left femur ORIF noted - no new   fracture-subluxation; no periprosthetic fracture.     < end of copied text >    Imaging Personally Reviewed:  [X ] YES  [ ] NO    Consultant(s) Notes Reviewed:  [X YES  [ ] NO    Care Discussed with Consultants/Other Providers [ X] YES  [ ] NO

## 2017-08-02 NOTE — DIETITIAN INITIAL EVALUATION ADULT. - ENERGY NEEDS
Height (cm): 154.94 (08-01)  Weight (kg): 107.5 (08-02)  BMI (kg/m2): 44.8 (08-02)  IBW:   47.7 kg +/- 10%    % IBW: 225%    UBW: unknown   %UBW: unknown

## 2017-08-02 NOTE — DIETITIAN INITIAL EVALUATION ADULT. - OTHER INFO
Pt seen for CHF. Unable to interview pt due to impaired cognitive status. Per chart information, pt lives c family; has supportive children. No reports of N/V/C/D or chew/swallowing difficulty; per swallow eval (8/1) recommended regular consistency c thin liquids. Morbid obesity status not documented in chart.

## 2017-08-02 NOTE — CHART NOTE - NSCHARTNOTEFT_GEN_A_CORE
Medicine PA Note    Called by Rn that patient is complaining of lower abdominal pain. Patient seen and examined at bedside. Medicine PA Note    Called by Rn that patient is complaining of lower abdominal pain. Patient seen and examined at bedside. Patient denies having bowel movement. positive flatus. Patient denies nausea, vomiting, cold, cough, dysuria, sob, sorethroat, cp or palp.     vsVital Signs Last 24 Hrs  T(C): 36.3 (02 Aug 2017 04:50), Max: 37.7 (02 Aug 2017 00:26)  T(F): 97.4 (02 Aug 2017 04:50), Max: 99.8 (02 Aug 2017 00:26)  HR: 77 (02 Aug 2017 04:50) (66 - 77)  BP: 122/88 (02 Aug 2017 04:50) (122/88 - 160/80)  BP(mean): --  RR: 20 (02 Aug 2017 04:50) (16 - 20)  SpO2: 95% (02 Aug 2017 04:50) (95% - 100%)    Gen patient is alert, awake and oriented x 1 confused  Lung cta b/l no rales, no rhonchi. no wheezing  Cv S1 S2 no murmurs appreciated  ABd soft ttp rlq and llq +BS in all 4 quadrants  ext edema b/l    A/P · Medicine PA Note    Called by Rn that patient is complaining of lower abdominal pain. Patient seen and examined at bedside. Patient denies having bowel movement. positive flatus. Patient denies nausea, vomiting, cold, cough, dysuria, sob, sorethroat, cp or palp.     vsVital Signs Last 24 Hrs  T(C): 36.3 (02 Aug 2017 04:50), Max: 37.7 (02 Aug 2017 00:26)  T(F): 97.4 (02 Aug 2017 04:50), Max: 99.8 (02 Aug 2017 00:26)  HR: 77 (02 Aug 2017 04:50) (66 - 77)  BP: 122/88 (02 Aug 2017 04:50) (122/88 - 160/80)  BP(mean): --  RR: 20 (02 Aug 2017 04:50) (16 - 20)  SpO2: 95% (02 Aug 2017 04:50) (95% - 100%)    Gen patient is alert, awake and oriented x 1 confused  Lung cta b/l no rales, no rhonchi. no wheezing  Cv S1 S2 no murmurs appreciated  ABd soft ttp rlq and llq +BS in all 4 quadrants  ext edema b/l    A/P ·89 y/o woman,  PMH HTN, CHF, recent  development of pedal edema, no CP or SOB. Pt on home diuretics possible non compliance. Previous echo showed EF greater than 60%.  possible diastolic heart failure.    1. ABD Pain     will give one dose of simethicone      KUB xray  Will continue to monitor   MBraito Medicine PA Note    Called by Rn that patient is complaining of lower abdominal pain. Patient seen and examined at bedside. Patient denies having bowel movement. positive flatus. Patient denies nausea, vomiting, cold, cough, dysuria, sob, sorethroat, cp or palp.     vsVital Signs Last 24 Hrs  T(C): 36.3 (02 Aug 2017 04:50), Max: 37.7 (02 Aug 2017 00:26)  T(F): 97.4 (02 Aug 2017 04:50), Max: 99.8 (02 Aug 2017 00:26)  HR: 77 (02 Aug 2017 04:50) (66 - 77)  BP: 122/88 (02 Aug 2017 04:50) (122/88 - 160/80)  BP(mean): --  RR: 20 (02 Aug 2017 04:50) (16 - 20)  SpO2: 95% (02 Aug 2017 04:50) (95% - 100%)    Gen patient is alert, awake and oriented x 1 name confused   Lung cta b/l no rales, no rhonchi. no wheezing  Cv S1 S2 no murmurs appreciated  ABd soft ttp rlq and llq +BS in all 4 quadrants  ext edema b/l    A/P ·89 y/o woman,  PMH HTN, CHF, recent  development of pedal edema, no CP or SOB. Pt on home diuretics possible non compliance. Previous echo showed EF greater than 60%.  possible diastolic heart failure.    1. ABD Pain     will give one dose of simethicone      KUB xray  Will continue to monitor   MBraito

## 2017-08-02 NOTE — PROGRESS NOTE ADULT - ASSESSMENT
89 y/o woman,  PMH HTN, CHF, recent  development of pedal edema, no CP or SOB. Pt on home diuretics possible non compliance. Previous echo showed EF greater than 60%.  possible diastolic heart failure.     Improved dependent edema which responded to diuretics no DVT   will dc home in am

## 2017-08-03 ENCOUNTER — TRANSCRIPTION ENCOUNTER (OUTPATIENT)
Age: 82
End: 2017-08-03

## 2017-08-03 VITALS
OXYGEN SATURATION: 99 % | RESPIRATION RATE: 19 BRPM | DIASTOLIC BLOOD PRESSURE: 66 MMHG | TEMPERATURE: 99 F | HEART RATE: 68 BPM | SYSTOLIC BLOOD PRESSURE: 110 MMHG

## 2017-08-03 LAB
-  AMPICILLIN: SIGNIFICANT CHANGE UP
-  CIPROFLOXACIN: SIGNIFICANT CHANGE UP
-  NITROFURANTOIN: SIGNIFICANT CHANGE UP
-  TETRACYCLINE: SIGNIFICANT CHANGE UP
-  VANCOMYCIN: SIGNIFICANT CHANGE UP
ANION GAP SERPL CALC-SCNC: 8 MMOL/L — SIGNIFICANT CHANGE UP (ref 5–17)
BUN SERPL-MCNC: 28 MG/DL — HIGH (ref 7–23)
CALCIUM SERPL-MCNC: 9.8 MG/DL — SIGNIFICANT CHANGE UP (ref 8.5–10.1)
CHLORIDE SERPL-SCNC: 104 MMOL/L — SIGNIFICANT CHANGE UP (ref 96–108)
CO2 SERPL-SCNC: 27 MMOL/L — SIGNIFICANT CHANGE UP (ref 22–31)
CREAT SERPL-MCNC: 2.01 MG/DL — HIGH (ref 0.5–1.3)
CULTURE RESULTS: SIGNIFICANT CHANGE UP
GLUCOSE SERPL-MCNC: 99 MG/DL — SIGNIFICANT CHANGE UP (ref 70–99)
HCT VFR BLD CALC: 32.8 % — LOW (ref 34.5–45)
HGB BLD-MCNC: 10.1 G/DL — LOW (ref 11.5–15.5)
MCHC RBC-ENTMCNC: 30.1 PG — SIGNIFICANT CHANGE UP (ref 27–34)
MCHC RBC-ENTMCNC: 30.8 GM/DL — LOW (ref 32–36)
MCV RBC AUTO: 97.5 FL — SIGNIFICANT CHANGE UP (ref 80–100)
METHOD TYPE: SIGNIFICANT CHANGE UP
ORGANISM # SPEC MICROSCOPIC CNT: SIGNIFICANT CHANGE UP
ORGANISM # SPEC MICROSCOPIC CNT: SIGNIFICANT CHANGE UP
PLATELET # BLD AUTO: 222 K/UL — SIGNIFICANT CHANGE UP (ref 150–400)
POTASSIUM SERPL-MCNC: 4.5 MMOL/L — SIGNIFICANT CHANGE UP (ref 3.5–5.3)
POTASSIUM SERPL-SCNC: 4.5 MMOL/L — SIGNIFICANT CHANGE UP (ref 3.5–5.3)
RBC # BLD: 3.36 M/UL — LOW (ref 3.8–5.2)
RBC # FLD: 14.7 % — SIGNIFICANT CHANGE UP (ref 11–15)
SODIUM SERPL-SCNC: 139 MMOL/L — SIGNIFICANT CHANGE UP (ref 135–145)
SPECIMEN SOURCE: SIGNIFICANT CHANGE UP
WBC # BLD: 6.7 K/UL — SIGNIFICANT CHANGE UP (ref 3.8–10.5)
WBC # FLD AUTO: 6.7 K/UL — SIGNIFICANT CHANGE UP (ref 3.8–10.5)

## 2017-08-03 PROCEDURE — 99232 SBSQ HOSP IP/OBS MODERATE 35: CPT

## 2017-08-03 PROCEDURE — 99239 HOSP IP/OBS DSCHRG MGMT >30: CPT

## 2017-08-03 RX ADMIN — Medication 100 MILLIGRAM(S): at 06:49

## 2017-08-03 RX ADMIN — LOSARTAN POTASSIUM 100 MILLIGRAM(S): 100 TABLET, FILM COATED ORAL at 07:03

## 2017-08-03 RX ADMIN — Medication 1 TABLET(S): at 12:14

## 2017-08-03 RX ADMIN — Medication 100 MILLIGRAM(S): at 13:56

## 2017-08-03 RX ADMIN — ANASTROZOLE 1 MILLIGRAM(S): 1 TABLET ORAL at 12:14

## 2017-08-03 RX ADMIN — MEMANTINE HYDROCHLORIDE 10 MILLIGRAM(S): 10 TABLET ORAL at 12:14

## 2017-08-03 RX ADMIN — HEPARIN SODIUM 5000 UNIT(S): 5000 INJECTION INTRAVENOUS; SUBCUTANEOUS at 06:51

## 2017-08-03 NOTE — DISCHARGE NOTE ADULT - MEDICATION SUMMARY - MEDICATIONS TO TAKE
I will START or STAY ON the medications listed below when I get home from the hospital:    acetaminophen 325 mg oral tablet  -- 2 tab(s) by mouth every 6 hours, As needed, Mild Pain (1 - 3)  -- Indication: For pain med at home    oxyCODONE 5 mg oral tablet  -- 1 tab(s) by mouth every 4 hours, As needed, Moderate Pain (4 - 6)  -- Indication: For pain med at home     Benicar 20 mg oral tablet  -- 1 tab(s) by mouth once a day  -- Indication: For Essential hypertension    Zoloft 25 mg oral tablet  -- 1 tab(s) by mouth once a day  -- Indication: For Dementia without behavioral disturbance, unspecified dementia type    cholestyramine 4 g/5 g oral powder for reconstitution  --  by mouth 3 times a day  -- Indication: For CHolesterol     Uloric 40 mg oral tablet  -- 1 tab(s) by mouth once a day  -- Indication: For gout    anastrozole 1 mg oral tablet  -- 1 tab(s) by mouth once a day  -- Indication: For Breast CAncer     bumetanide 1 mg oral tablet  -- 1 tab(s) by mouth once a day  -- Indication: For water pill     docusate sodium 100 mg oral capsule  -- 1 cap(s) by mouth 3 times a day  -- Indication: For Constipation     Namenda 10 mg oral tablet  -- 1 tab(s) by mouth 2 times a day  -- Indication: For Dementia without behavioral disturbance, unspecified dementia type    multivitamin  -- 1 tab(s)  once a day  -- Indication: For supplement

## 2017-08-03 NOTE — DISCHARGE NOTE ADULT - CARE PROVIDER_API CALL
Krys Yuen), Internal Medicine; Pediatrics  [address] [place] [address]  Phone: [phone]  Fax: [phone]

## 2017-08-03 NOTE — DISCHARGE NOTE ADULT - PATIENT PORTAL LINK FT
“You can access the [employer] Patient Portal, offered by [facility], by registering with the following website: [email or web address]”

## 2017-08-03 NOTE — DISCHARGE NOTE ADULT - HOSPITAL COURSE
History and Physical:   Source of Information	Chart(s), Patient	  Outpatient Providers	· Primary Care Provider Dr Krys Yuen	    Language:  · Patient/Family of Limited English Proficiency	No	      History of Present Illness:  History of Present Illness: 	  89 year old female with PMH of dementia, HLD, HTN, CHF, breast CA, had tib/fib fracture in April after 2 months rehab sent home, has been able to go from bed to wheelchair until this past week, now with increased leg swelling has not been able to transfer from bed to chair and has been complaining of leg pain and swelling. Her son brought pt in to evaluation for issue. No SOB or complaints of chest pain. Pt with dementia, unable to give history    Patient evaluated by cardiology deemed not likely CHF but dependent edema based on past echo patient responded well to diuretic therapy     greater than 30 minutes to completthic complex discharge

## 2017-08-03 NOTE — DISCHARGE NOTE ADULT - CARE PLAN
Principal Discharge DX:	Leg edema  Goal:	keep legs elevated take watyer pill  Instructions for follow-up, activity and diet:	please follow up with PMDF

## 2017-08-07 DIAGNOSIS — Z85.3 PERSONAL HISTORY OF MALIGNANT NEOPLASM OF BREAST: ICD-10-CM

## 2017-08-07 DIAGNOSIS — Z91.19 PATIENT'S NONCOMPLIANCE WITH OTHER MEDICAL TREATMENT AND REGIMEN: ICD-10-CM

## 2017-08-07 DIAGNOSIS — Z96.653 PRESENCE OF ARTIFICIAL KNEE JOINT, BILATERAL: ICD-10-CM

## 2017-08-07 DIAGNOSIS — I11.0 HYPERTENSIVE HEART DISEASE WITH HEART FAILURE: ICD-10-CM

## 2017-08-07 DIAGNOSIS — I89.0 LYMPHEDEMA, NOT ELSEWHERE CLASSIFIED: ICD-10-CM

## 2017-08-07 DIAGNOSIS — I50.32 CHRONIC DIASTOLIC (CONGESTIVE) HEART FAILURE: ICD-10-CM

## 2017-08-07 DIAGNOSIS — F03.90 UNSPECIFIED DEMENTIA, UNSPECIFIED SEVERITY, WITHOUT BEHAVIORAL DISTURBANCE, PSYCHOTIC DISTURBANCE, MOOD DISTURBANCE, AND ANXIETY: ICD-10-CM

## 2017-08-07 DIAGNOSIS — K59.00 CONSTIPATION, UNSPECIFIED: ICD-10-CM

## 2017-08-07 DIAGNOSIS — M10.9 GOUT, UNSPECIFIED: ICD-10-CM

## 2017-08-07 DIAGNOSIS — E66.01 MORBID (SEVERE) OBESITY DUE TO EXCESS CALORIES: ICD-10-CM

## 2017-08-07 DIAGNOSIS — E78.5 HYPERLIPIDEMIA, UNSPECIFIED: ICD-10-CM

## 2017-08-07 DIAGNOSIS — I82.521 CHRONIC EMBOLISM AND THROMBOSIS OF RIGHT ILIAC VEIN: ICD-10-CM

## 2018-08-23 ENCOUNTER — INPATIENT (INPATIENT)
Facility: HOSPITAL | Age: 83
LOS: 4 days | Discharge: HOME HEALTH | End: 2018-08-28
Attending: HOSPITALIST | Admitting: HOSPITALIST
Payer: MEDICARE

## 2018-08-23 VITALS
HEART RATE: 78 BPM | HEIGHT: 67 IN | OXYGEN SATURATION: 99 % | SYSTOLIC BLOOD PRESSURE: 139 MMHG | TEMPERATURE: 98 F | DIASTOLIC BLOOD PRESSURE: 85 MMHG | WEIGHT: 169.98 LBS | RESPIRATION RATE: 18 BRPM

## 2018-08-23 DIAGNOSIS — N39.0 URINARY TRACT INFECTION, SITE NOT SPECIFIED: ICD-10-CM

## 2018-08-23 DIAGNOSIS — I10 ESSENTIAL (PRIMARY) HYPERTENSION: ICD-10-CM

## 2018-08-23 DIAGNOSIS — C50.919 MALIGNANT NEOPLASM OF UNSPECIFIED SITE OF UNSPECIFIED FEMALE BREAST: ICD-10-CM

## 2018-08-23 DIAGNOSIS — Z96.651 PRESENCE OF RIGHT ARTIFICIAL KNEE JOINT: Chronic | ICD-10-CM

## 2018-08-23 DIAGNOSIS — Z96.652 PRESENCE OF LEFT ARTIFICIAL KNEE JOINT: Chronic | ICD-10-CM

## 2018-08-23 DIAGNOSIS — R06.02 SHORTNESS OF BREATH: ICD-10-CM

## 2018-08-23 DIAGNOSIS — G30.9 ALZHEIMER'S DISEASE, UNSPECIFIED: ICD-10-CM

## 2018-08-23 LAB
ALBUMIN SERPL ELPH-MCNC: 3.4 G/DL — SIGNIFICANT CHANGE UP (ref 3.3–5)
ALP SERPL-CCNC: 61 U/L — SIGNIFICANT CHANGE UP (ref 40–120)
ALT FLD-CCNC: 29 U/L — SIGNIFICANT CHANGE UP (ref 12–78)
ANION GAP SERPL CALC-SCNC: 7 MMOL/L — SIGNIFICANT CHANGE UP (ref 5–17)
APPEARANCE UR: ABNORMAL
AST SERPL-CCNC: 37 U/L — SIGNIFICANT CHANGE UP (ref 15–37)
BACTERIA # UR AUTO: ABNORMAL
BASE EXCESS BLDA CALC-SCNC: 3.3 MMOL/L — HIGH (ref -2–2)
BASOPHILS # BLD AUTO: 0.03 K/UL — SIGNIFICANT CHANGE UP (ref 0–0.2)
BASOPHILS NFR BLD AUTO: 0.3 % — SIGNIFICANT CHANGE UP (ref 0–2)
BILIRUB SERPL-MCNC: 0.4 MG/DL — SIGNIFICANT CHANGE UP (ref 0.2–1.2)
BILIRUB UR-MCNC: NEGATIVE — SIGNIFICANT CHANGE UP
BLOOD GAS COMMENTS: SIGNIFICANT CHANGE UP
BLOOD GAS COMMENTS: SIGNIFICANT CHANGE UP
BLOOD GAS SOURCE: SIGNIFICANT CHANGE UP
BUN SERPL-MCNC: 26 MG/DL — HIGH (ref 7–23)
CALCIUM SERPL-MCNC: 10.1 MG/DL — SIGNIFICANT CHANGE UP (ref 8.5–10.1)
CHLORIDE SERPL-SCNC: 103 MMOL/L — SIGNIFICANT CHANGE UP (ref 96–108)
CK MB CFR SERPL CALC: 1.7 NG/ML — SIGNIFICANT CHANGE UP (ref 0.5–3.6)
CO2 SERPL-SCNC: 28 MMOL/L — SIGNIFICANT CHANGE UP (ref 22–31)
COLOR SPEC: YELLOW — SIGNIFICANT CHANGE UP
CREAT SERPL-MCNC: 1.7 MG/DL — HIGH (ref 0.5–1.3)
DIFF PNL FLD: ABNORMAL
EOSINOPHIL # BLD AUTO: 0.04 K/UL — SIGNIFICANT CHANGE UP (ref 0–0.5)
EOSINOPHIL NFR BLD AUTO: 0.5 % — SIGNIFICANT CHANGE UP (ref 0–6)
EPI CELLS # UR: SIGNIFICANT CHANGE UP
GLUCOSE SERPL-MCNC: 101 MG/DL — HIGH (ref 70–99)
GLUCOSE UR QL: NEGATIVE MG/DL — SIGNIFICANT CHANGE UP
HCO3 BLDA-SCNC: 28 MMOL/L — SIGNIFICANT CHANGE UP (ref 21–29)
HCT VFR BLD CALC: 36.3 % — SIGNIFICANT CHANGE UP (ref 34.5–45)
HGB BLD-MCNC: 10.9 G/DL — LOW (ref 11.5–15.5)
HOROWITZ INDEX BLDA+IHG-RTO: 21 — SIGNIFICANT CHANGE UP
IMM GRANULOCYTES NFR BLD AUTO: 0.1 % — SIGNIFICANT CHANGE UP (ref 0–1.5)
KETONES UR-MCNC: NEGATIVE — SIGNIFICANT CHANGE UP
LACTATE SERPL-SCNC: 0.5 MMOL/L — LOW (ref 0.7–2)
LEUKOCYTE ESTERASE UR-ACNC: ABNORMAL
LIDOCAIN IGE QN: 123 U/L — SIGNIFICANT CHANGE UP (ref 73–393)
LYMPHOCYTES # BLD AUTO: 1.25 K/UL — SIGNIFICANT CHANGE UP (ref 1–3.3)
LYMPHOCYTES # BLD AUTO: 14.6 % — SIGNIFICANT CHANGE UP (ref 13–44)
MCHC RBC-ENTMCNC: 29 PG — SIGNIFICANT CHANGE UP (ref 27–34)
MCHC RBC-ENTMCNC: 30 GM/DL — LOW (ref 32–36)
MCV RBC AUTO: 96.5 FL — SIGNIFICANT CHANGE UP (ref 80–100)
MONOCYTES # BLD AUTO: 0.77 K/UL — SIGNIFICANT CHANGE UP (ref 0–0.9)
MONOCYTES NFR BLD AUTO: 9 % — SIGNIFICANT CHANGE UP (ref 2–14)
NEUTROPHILS # BLD AUTO: 6.48 K/UL — SIGNIFICANT CHANGE UP (ref 1.8–7.4)
NEUTROPHILS NFR BLD AUTO: 75.5 % — SIGNIFICANT CHANGE UP (ref 43–77)
NITRITE UR-MCNC: POSITIVE
NRBC # BLD: 0 /100 WBCS — SIGNIFICANT CHANGE UP (ref 0–0)
NT-PROBNP SERPL-SCNC: 5054 PG/ML — HIGH (ref 0–450)
PCO2 BLDA: 45 MMHG — SIGNIFICANT CHANGE UP (ref 32–46)
PH BLD: 7.41 — SIGNIFICANT CHANGE UP (ref 7.35–7.45)
PH UR: 8 — SIGNIFICANT CHANGE UP (ref 5–8)
PLATELET # BLD AUTO: 269 K/UL — SIGNIFICANT CHANGE UP (ref 150–400)
PO2 BLDA: 77 MMHG — SIGNIFICANT CHANGE UP (ref 74–108)
POTASSIUM SERPL-MCNC: 5.4 MMOL/L — HIGH (ref 3.5–5.3)
POTASSIUM SERPL-SCNC: 5.4 MMOL/L — HIGH (ref 3.5–5.3)
PROT SERPL-MCNC: 8.7 GM/DL — HIGH (ref 6–8.3)
PROT UR-MCNC: 30 MG/DL
RBC # BLD: 3.76 M/UL — LOW (ref 3.8–5.2)
RBC # FLD: 13.7 % — SIGNIFICANT CHANGE UP (ref 10.3–14.5)
RBC CASTS # UR COMP ASSIST: ABNORMAL /HPF (ref 0–4)
SAO2 % BLDA: 96 % — SIGNIFICANT CHANGE UP (ref 92–96)
SODIUM SERPL-SCNC: 138 MMOL/L — SIGNIFICANT CHANGE UP (ref 135–145)
SP GR SPEC: 1.01 — SIGNIFICANT CHANGE UP (ref 1.01–1.02)
TROPONIN I SERPL-MCNC: 0.03 NG/ML — SIGNIFICANT CHANGE UP (ref 0.01–0.04)
UROBILINOGEN FLD QL: NEGATIVE MG/DL — SIGNIFICANT CHANGE UP
WBC # BLD: 8.58 K/UL — SIGNIFICANT CHANGE UP (ref 3.8–10.5)
WBC # FLD AUTO: 8.58 K/UL — SIGNIFICANT CHANGE UP (ref 3.8–10.5)
WBC UR QL: >50

## 2018-08-23 PROCEDURE — 99285 EMERGENCY DEPT VISIT HI MDM: CPT

## 2018-08-23 PROCEDURE — 99222 1ST HOSP IP/OBS MODERATE 55: CPT

## 2018-08-23 PROCEDURE — 71045 X-RAY EXAM CHEST 1 VIEW: CPT | Mod: 26

## 2018-08-23 RX ORDER — FUROSEMIDE 40 MG
40 TABLET ORAL EVERY 12 HOURS
Qty: 0 | Refills: 0 | Status: DISCONTINUED | OUTPATIENT
Start: 2018-08-23 | End: 2018-08-27

## 2018-08-23 RX ORDER — MEMANTINE HYDROCHLORIDE 10 MG/1
10 TABLET ORAL
Qty: 0 | Refills: 0 | Status: DISCONTINUED | OUTPATIENT
Start: 2018-08-23 | End: 2018-08-28

## 2018-08-23 RX ORDER — SERTRALINE 25 MG/1
25 TABLET, FILM COATED ORAL DAILY
Qty: 0 | Refills: 0 | Status: DISCONTINUED | OUTPATIENT
Start: 2018-08-23 | End: 2018-08-28

## 2018-08-23 RX ORDER — DOCUSATE SODIUM 100 MG
100 CAPSULE ORAL THREE TIMES A DAY
Qty: 0 | Refills: 0 | Status: DISCONTINUED | OUTPATIENT
Start: 2018-08-23 | End: 2018-08-28

## 2018-08-23 RX ORDER — BUMETANIDE 0.25 MG/ML
1 INJECTION INTRAMUSCULAR; INTRAVENOUS ONCE
Qty: 0 | Refills: 0 | Status: COMPLETED | OUTPATIENT
Start: 2018-08-23 | End: 2018-08-23

## 2018-08-23 RX ORDER — CHOLESTYRAMINE 4 G/9G
4 POWDER, FOR SUSPENSION ORAL THREE TIMES A DAY
Qty: 0 | Refills: 0 | Status: DISCONTINUED | OUTPATIENT
Start: 2018-08-23 | End: 2018-08-28

## 2018-08-23 RX ORDER — HEPARIN SODIUM 5000 [USP'U]/ML
5000 INJECTION INTRAVENOUS; SUBCUTANEOUS EVERY 12 HOURS
Qty: 0 | Refills: 0 | Status: DISCONTINUED | OUTPATIENT
Start: 2018-08-23 | End: 2018-08-28

## 2018-08-23 RX ORDER — LOSARTAN POTASSIUM 100 MG/1
25 TABLET, FILM COATED ORAL DAILY
Qty: 0 | Refills: 0 | Status: DISCONTINUED | OUTPATIENT
Start: 2018-08-23 | End: 2018-08-28

## 2018-08-23 RX ORDER — CEFTRIAXONE 500 MG/1
1 INJECTION, POWDER, FOR SOLUTION INTRAMUSCULAR; INTRAVENOUS ONCE
Qty: 0 | Refills: 0 | Status: COMPLETED | OUTPATIENT
Start: 2018-08-23 | End: 2018-08-23

## 2018-08-23 RX ORDER — ANASTROZOLE 1 MG/1
1 TABLET ORAL DAILY
Qty: 0 | Refills: 0 | Status: DISCONTINUED | OUTPATIENT
Start: 2018-08-23 | End: 2018-08-28

## 2018-08-23 RX ADMIN — CEFTRIAXONE 100 GRAM(S): 500 INJECTION, POWDER, FOR SOLUTION INTRAMUSCULAR; INTRAVENOUS at 18:21

## 2018-08-23 RX ADMIN — CHOLESTYRAMINE 4 GRAM(S): 4 POWDER, FOR SUSPENSION ORAL at 22:29

## 2018-08-23 RX ADMIN — Medication 100 MILLIGRAM(S): at 22:29

## 2018-08-23 RX ADMIN — BUMETANIDE 1 MILLIGRAM(S): 0.25 INJECTION INTRAMUSCULAR; INTRAVENOUS at 17:46

## 2018-08-23 NOTE — H&P ADULT - NSHPPHYSICALEXAM_GEN_ALL_CORE
GENERAL: NAD well-developed  HEAD:  Atraumatic, Normocephalic  EYES: EOMI, PERRLA, conjunctiva and sclera clear  ENMT: No tonsillar erythema, exudates, or enlargement; Moist mucous membranes, Good dentition, No lesions  NECK: Supple, No JVD, Normal thyroid  NERVOUS SYSTEM:  Alert & Oriented X3, Good concentration; Motor Strength 5/5 B/L upper and lower extremities; DTRs 2+ intact and symmetric  CHEST/LUNG: Clear to percussion bilaterally; No rales, rhonchi, wheezing, or rubs  HEART: Regular rate and rhythm; No murmurs, rubs, or gallops  ABDOMEN: Soft, Nontender, Nondistended; Bowel sounds present  EXTREMITIES:  2+ Peripheral Pulses, No clubbing, cyanosis, or edema  LYMPH: No lymphadenopathy   SKIN: No rashes or lesions GENERAL: NAD well-developed  HEAD:  Atraumatic, Normocephalic  EYES: EOMI, PERRLA, conjunctiva and sclera clear  ENMT: No tonsillar erythema, exudates, or enlargement; Moist mucous membranes, Good dentition, No lesions  NECK: Supple, No JVD, Normal thyroid  NERVOUS SYSTEM:  Alert but nonverbal  Motor Strength 4/5 B/L upper and lower extremities; DTRs 2+ intact and symmetric  CHEST/LUNG: Clear to percussion bilaterally; No rales, rhonchi, wheezing, or rubs  HEART: Regular rate and rhythm; No murmurs, rubs, or gallops  ABDOMEN: Soft, Nontender, Nondistended; Bowel sounds present  EXTREMITIES:  2+ Peripheral Pulses, No clubbing, cyanosis, or edema  LYMPH: No lymphadenopathy   SKIN: No rashes or lesions

## 2018-08-23 NOTE — ED PROVIDER NOTE - OBJECTIVE STATEMENT
Pt is a 89 yo lady with a pmhx of HTN, HL, CHF, breast ca, dementia who presents to the ED with respiratory difficulty. Son says she had episode of difficulty breathing, at home. EMS reportedly found O2 at 80s. Pt nonverbal at baseline. Pt has no fevers or cough per son.

## 2018-08-23 NOTE — ED ADULT NURSE NOTE - NSIMPLEMENTINTERV_GEN_ALL_ED
Implemented All Fall Risk Interventions:  [place] to call system. Call bell, personal items and telephone within reach. Instruct patient to call for assistance. Room bathroom lighting operational. Non-slip footwear when patient is off stretcher. Physically safe environment: no spills, clutter or unnecessary equipment. Stretcher in lowest position, wheels locked, appropriate side rails in place. Provide visual cue, wrist band, yellow gown, etc. Monitor gait and stability. Monitor for mental status changes and reorient to person, place, and time. Review medications for side effects contributing to fall risk. Reinforce activity limits and safety measures with patient and family.

## 2018-08-23 NOTE — ED ADULT TRIAGE NOTE - CHIEF COMPLAINT QUOTE
BIBA called by family for increased respiratory effort at home, EMS reports patient at baseline mental status as per family. EMS reports pt saturation in 80's on their arrival at scene, pt was placed on 02 nasal canula at 2 liters, EMS reports improvement in o2 saturation

## 2018-08-23 NOTE — H&P ADULT - NSHPLABSRESULTS_GEN_ALL_CORE
10.9   8.58  )-----------( 269      ( 23 Aug 2018 16:37 )             36.3   08-23    138  |  103  |  26<H>  ----------------------------<  101<H>  5.4<H>   |  28  |  1.70<H>    Ca    10.1      23 Aug 2018 16:37    TPro  8.7<H>  /  Alb  3.4  /  TBili  0.4  /  DBili  x   /  AST  37  /  ALT  29  /  AlkPhos  61  08-23  < from: Xray Chest 1 View AP/PA. (08.23.18 @ 16:38) >      Heart size cannot be accurately assessed in this projection.

## 2018-08-23 NOTE — H&P ADULT - ASSESSMENT
89f nonverbal sent to the hospital with short of breath found to have a urinary tract infection and elevated bnp     IMPROVE VTE Individual Risk Assessment          RISK                                                          Points    [  ] Previous VTE                                                3    [  ] Thrombophilia                                             2    [  ] Lower limb paralysis                                    2        (unable to hold up >15 seconds)      [  ] Current Cancer                                             2         (within 6 months)    [ x ] Immobilization > 24 hrs                              1    [  ] ICU/CCU stay > 24 hours                            1    [ x ] Age > 60                                                    1    IMPROVE VTE Score 2_________

## 2018-08-23 NOTE — H&P ADULT - HISTORY OF PRESENT ILLNESS
Pt is a 89 yo lady with a pmhx of HTN, HL, CHF, breast ca, dementia who presents to the ED with respiratory difficulty. Son says she had episode of difficulty breathing, at home. EMS reportedly found O2 at 80s. Pt nonverbal at baseline. Pt has no fevers or cough per son. Pt is a 89 yo lady with a pmhx of HTN, HL, CHF, breast ca, dementia who presents to the ED with respiratory difficulty. Son says she had episode of difficulty breathing, at home. EMS reportedly found O2 at 80s. Pt nonverbal at baseline. Pt has no fevers or cough per son as per emergency room - attempted to call the sson but was unavailable

## 2018-08-23 NOTE — ED PROVIDER NOTE - CARE PLAN
Principal Discharge DX:	SOB (shortness of breath) Principal Discharge DX:	SOB (shortness of breath)  Secondary Diagnosis:	Urinary tract infection without hematuria, site unspecified

## 2018-08-24 LAB
ANION GAP SERPL CALC-SCNC: 6 MMOL/L — SIGNIFICANT CHANGE UP (ref 5–17)
BUN SERPL-MCNC: 27 MG/DL — HIGH (ref 7–23)
CALCIUM SERPL-MCNC: 9.8 MG/DL — SIGNIFICANT CHANGE UP (ref 8.5–10.1)
CHLORIDE SERPL-SCNC: 104 MMOL/L — SIGNIFICANT CHANGE UP (ref 96–108)
CO2 SERPL-SCNC: 31 MMOL/L — SIGNIFICANT CHANGE UP (ref 22–31)
CREAT SERPL-MCNC: 1.8 MG/DL — HIGH (ref 0.5–1.3)
GLUCOSE SERPL-MCNC: 89 MG/DL — SIGNIFICANT CHANGE UP (ref 70–99)
HCT VFR BLD CALC: 33.8 % — LOW (ref 34.5–45)
HGB BLD-MCNC: 10.2 G/DL — LOW (ref 11.5–15.5)
MCHC RBC-ENTMCNC: 28.9 PG — SIGNIFICANT CHANGE UP (ref 27–34)
MCHC RBC-ENTMCNC: 30.2 GM/DL — LOW (ref 32–36)
MCV RBC AUTO: 95.8 FL — SIGNIFICANT CHANGE UP (ref 80–100)
NRBC # BLD: 0 /100 WBCS — SIGNIFICANT CHANGE UP (ref 0–0)
NT-PROBNP SERPL-SCNC: 4128 PG/ML — HIGH (ref 0–450)
PLATELET # BLD AUTO: 258 K/UL — SIGNIFICANT CHANGE UP (ref 150–400)
POTASSIUM SERPL-MCNC: 4.6 MMOL/L — SIGNIFICANT CHANGE UP (ref 3.5–5.3)
POTASSIUM SERPL-SCNC: 4.6 MMOL/L — SIGNIFICANT CHANGE UP (ref 3.5–5.3)
RBC # BLD: 3.53 M/UL — LOW (ref 3.8–5.2)
RBC # FLD: 13.5 % — SIGNIFICANT CHANGE UP (ref 10.3–14.5)
SODIUM SERPL-SCNC: 141 MMOL/L — SIGNIFICANT CHANGE UP (ref 135–145)
WBC # BLD: 6.38 K/UL — SIGNIFICANT CHANGE UP (ref 3.8–10.5)
WBC # FLD AUTO: 6.38 K/UL — SIGNIFICANT CHANGE UP (ref 3.8–10.5)

## 2018-08-24 PROCEDURE — 93010 ELECTROCARDIOGRAM REPORT: CPT

## 2018-08-24 PROCEDURE — 99223 1ST HOSP IP/OBS HIGH 75: CPT

## 2018-08-24 PROCEDURE — 99233 SBSQ HOSP IP/OBS HIGH 50: CPT

## 2018-08-24 RX ORDER — CEFTRIAXONE 500 MG/1
INJECTION, POWDER, FOR SOLUTION INTRAMUSCULAR; INTRAVENOUS
Qty: 0 | Refills: 0 | Status: DISCONTINUED | OUTPATIENT
Start: 2018-08-24 | End: 2018-08-28

## 2018-08-24 RX ORDER — ACETAMINOPHEN 500 MG
650 TABLET ORAL EVERY 6 HOURS
Qty: 0 | Refills: 0 | Status: DISCONTINUED | OUTPATIENT
Start: 2018-08-24 | End: 2018-08-28

## 2018-08-24 RX ORDER — SENNA PLUS 8.6 MG/1
2 TABLET ORAL AT BEDTIME
Qty: 0 | Refills: 0 | Status: DISCONTINUED | OUTPATIENT
Start: 2018-08-24 | End: 2018-08-28

## 2018-08-24 RX ORDER — ACETAMINOPHEN 500 MG
650 TABLET ORAL EVERY 6 HOURS
Qty: 0 | Refills: 0 | Status: DISCONTINUED | OUTPATIENT
Start: 2018-08-24 | End: 2018-08-24

## 2018-08-24 RX ORDER — CEFTRIAXONE 500 MG/1
1 INJECTION, POWDER, FOR SOLUTION INTRAMUSCULAR; INTRAVENOUS EVERY 24 HOURS
Qty: 0 | Refills: 0 | Status: DISCONTINUED | OUTPATIENT
Start: 2018-08-25 | End: 2018-08-28

## 2018-08-24 RX ORDER — ACETAMINOPHEN 500 MG
650 TABLET ORAL
Qty: 0 | Refills: 0 | Status: DISCONTINUED | OUTPATIENT
Start: 2018-08-24 | End: 2018-08-28

## 2018-08-24 RX ORDER — CEFTRIAXONE 500 MG/1
1 INJECTION, POWDER, FOR SOLUTION INTRAMUSCULAR; INTRAVENOUS ONCE
Qty: 0 | Refills: 0 | Status: COMPLETED | OUTPATIENT
Start: 2018-08-24 | End: 2018-08-24

## 2018-08-24 RX ORDER — OXYCODONE AND ACETAMINOPHEN 5; 325 MG/1; MG/1
1 TABLET ORAL ONCE
Qty: 0 | Refills: 0 | Status: DISCONTINUED | OUTPATIENT
Start: 2018-08-24 | End: 2018-08-24

## 2018-08-24 RX ADMIN — Medication 40 MILLIGRAM(S): at 05:21

## 2018-08-24 RX ADMIN — Medication 40 MILLIGRAM(S): at 17:50

## 2018-08-24 RX ADMIN — CEFTRIAXONE 100 GRAM(S): 500 INJECTION, POWDER, FOR SOLUTION INTRAMUSCULAR; INTRAVENOUS at 12:33

## 2018-08-24 RX ADMIN — Medication 1 TABLET(S): at 12:33

## 2018-08-24 RX ADMIN — Medication 100 MILLIGRAM(S): at 05:21

## 2018-08-24 RX ADMIN — MEMANTINE HYDROCHLORIDE 10 MILLIGRAM(S): 10 TABLET ORAL at 05:21

## 2018-08-24 RX ADMIN — CHOLESTYRAMINE 4 GRAM(S): 4 POWDER, FOR SUSPENSION ORAL at 13:51

## 2018-08-24 RX ADMIN — SENNA PLUS 2 TABLET(S): 8.6 TABLET ORAL at 22:43

## 2018-08-24 RX ADMIN — CHOLESTYRAMINE 4 GRAM(S): 4 POWDER, FOR SUSPENSION ORAL at 22:42

## 2018-08-24 RX ADMIN — HEPARIN SODIUM 5000 UNIT(S): 5000 INJECTION INTRAVENOUS; SUBCUTANEOUS at 17:50

## 2018-08-24 RX ADMIN — LOSARTAN POTASSIUM 25 MILLIGRAM(S): 100 TABLET, FILM COATED ORAL at 05:21

## 2018-08-24 RX ADMIN — HEPARIN SODIUM 5000 UNIT(S): 5000 INJECTION INTRAVENOUS; SUBCUTANEOUS at 05:21

## 2018-08-24 RX ADMIN — Medication 100 MILLIGRAM(S): at 22:42

## 2018-08-24 RX ADMIN — SERTRALINE 25 MILLIGRAM(S): 25 TABLET, FILM COATED ORAL at 12:33

## 2018-08-24 RX ADMIN — MEMANTINE HYDROCHLORIDE 10 MILLIGRAM(S): 10 TABLET ORAL at 17:50

## 2018-08-24 RX ADMIN — Medication 100 MILLIGRAM(S): at 13:51

## 2018-08-24 RX ADMIN — OXYCODONE AND ACETAMINOPHEN 1 TABLET(S): 5; 325 TABLET ORAL at 17:44

## 2018-08-24 RX ADMIN — CHOLESTYRAMINE 4 GRAM(S): 4 POWDER, FOR SUSPENSION ORAL at 05:21

## 2018-08-24 RX ADMIN — ANASTROZOLE 1 MILLIGRAM(S): 1 TABLET ORAL at 12:33

## 2018-08-24 NOTE — DIETITIAN INITIAL EVALUATION ADULT. - PERTINENT LABORATORY DATA
08-24 Na141 mmol/L Glu 89 mg/dL K+ 4.6 mmol/L Cr  1.80 mg/dL<H> BUN 27 mg/dL<H> 08-23 Alb 3.4 g/dL08-23 ALT 29 U/L AST 37 U/L Alkaline Phosphatase 61 U/L

## 2018-08-24 NOTE — DIETITIAN INITIAL EVALUATION ADULT. - NS AS NUTRI INTERV ED CONTENT
Pressure ulcer nutrition therapy left at bedside for family members/Purpose of the nutrition education/Survival information

## 2018-08-24 NOTE — PHYSICAL THERAPY INITIAL EVALUATION ADULT - MUSCLE TONE ASSESSMENT, REHAB EVAL
hypertonic/likely due to anxiety more than neurological cause/bilateral lower extremities/bilateral upper extremities

## 2018-08-24 NOTE — PHYSICAL THERAPY INITIAL EVALUATION ADULT - ADDITIONAL COMMENTS
(Continued from History of Current Problem above): Per nursing staff, patient was assisted by x3 personnel out of bed to recliner chair today. Attempted patient lift transfer device (in compliance c [facility] law) but patient very anxious, presenting with increased hip adductor tone, rendering this device unsafe currently. Attempted manual sit-to-stand device with assist of 4 therapists (DPT Solo, DPT Noé, DIEGO Schulte and this clinician). Patient remained very tensed, again rendering device unsafe. Empirically assisted back to bed c max assist of 4 via stand pivot transfer. PT wound care assessment performed and documented on flowsheet.    Per this same clinician's intake in July 2017:  Lives in private house. Has hospital bed, patient mechanical  (Naomie/ricardo)  and wheelchair. Has HHA services. Able to transfer from bed to chair c HHA stand pivoting patient. Unable to ambulate at baseline.

## 2018-08-24 NOTE — PHYSICAL THERAPY INITIAL EVALUATION ADULT - CRITERIA FOR SKILLED THERAPEUTIC INTERVENTIONS
risk reduction/prevention/impairments found/anticipated discharge recommendation risk reduction/prevention/anticipated discharge recommendation/Patient appears in baseline functions on assessment of bed mobility, transfers--baseline dependent. No further skilled PT indicated and does not require skilled PT services post-acute care discharge./impairments found

## 2018-08-24 NOTE — PHYSICAL THERAPY INITIAL EVALUATION ADULT - GENERAL OBSERVATIONS, REHAB EVAL
Patient sat up on recliner chair c lower limbs on dependent position. + nasal cannula. Seen c Senior DPT Noé and Student PT Jermaine. Private Caregiver Cori present by bedside for collateral information. Per Opal, found patient sat up on recliner since 11 am when she came in.

## 2018-08-24 NOTE — DIETITIAN INITIAL EVALUATION ADULT. - OTHER INFO
Pt seen today due to RN referral for Pressure ulcer stage 2 or >. Unable to obtain usual diet/ wt hx as no family at bedside.  Tolerance to meals has not been documented yet.

## 2018-08-24 NOTE — PHYSICAL THERAPY INITIAL EVALUATION ADULT - PERTINENT HX OF CURRENT PROBLEM, REHAB EVAL
Pateint brought in from home due to shortness of breath. Chart reviewed and noted raised serum BNP, negative troponins, urinalysis with positive nitrites and leukocytosis.  (Continued below on Additional Comments).

## 2018-08-24 NOTE — CONSULT NOTE ADULT - SUBJECTIVE AND OBJECTIVE BOX
CHIEF COMPLAINT:  Patient is a 89y old  Female who presents with a chief complaint of     HPI:  Pt is a 89 yo lady with a pmhx of HTN, HLD, CHF, breast ca, dementia who presents to the ED with respiratory difficulty. Family friend and health attendant at bedside says she had episode of difficulty breathing, at home. EMS reportedly found O2 at 80s. Pt nonverbal at baseline.    ALLERGIES:  No Known Allergies    Home Medications:  acetaminophen 325 mg oral tablet: 2 tab(s) orally every 6 hours, As needed, Mild Pain (1 - 3) (15 May 2017 07:12)  anastrozole 1 mg oral tablet: 1 tab(s) orally once a day (07 May 2017 20:32)  Benicar 20 mg oral tablet: 1 tab(s) orally once a day (07 May 2017 20:32)  bumetanide 1 mg oral tablet: 1 tab(s) orally once a day (07 May 2017 20:32)  cholestyramine 4 g/5 g oral powder for reconstitution:  orally 3 times a day (07 May 2017 20:32)  docusate sodium 100 mg oral capsule: 1 cap(s) orally 3 times a day (15 May 2017 07:11)  multivitamin: 1 tab(s)  once a day (07 May 2017 20:32)  Namenda 10 mg oral tablet: 1 tab(s) orally 2 times a day (07 May 2017 20:32)  oxyCODONE 5 mg oral tablet: 1 tab(s) orally every 4 hours, As needed, Moderate Pain (4 - 6) (15 May 2017 07:12)  Uloric 40 mg oral tablet: 1 tab(s) orally once a day (07 May 2017 20:32)  Zoloft 25 mg oral tablet: 1 tab(s) orally once a day (07 May 2017 20:32)      PAST MEDICAL & SURGICAL HISTORY:  Breast CA  Dementia  Hypertension  History of knee replacement procedure of left knee  History of knee replacement procedure of right knee      FAMILY HISTORY:  No pertinent family history in first degree relatives      SOCIAL HISTORY:  Non-smoker    REVIEW OF SYSTEMS:  Unable b/c of pt with dementia.    PHYSICAL EXAM:  Vital Signs:  Vital Signs Last 24 Hrs  T(C): 36.6 (24 Aug 2018 11:54), Max: 37.1 (24 Aug 2018 00:14)  T(F): 97.9 (24 Aug 2018 11:54), Max: 98.8 (24 Aug 2018 00:14)  HR: 72 (24 Aug 2018 11:54) (72 - 130)  BP: 145/68 (24 Aug 2018 11:54) (130/82 - 200/86)  RR: 16 (24 Aug 2018 11:54) (16 - 18)  SpO2: 96% (24 Aug 2018 11:54) (90% - 100%)  I&O's Summary      General:  Appears stated age, well-groomed, well-nourished, no distress  HEENT:  NC/AT, patent nares w/ pink mucosa, OP clear w/o lesions, conjunctivae clear, no thyromegaly, nodules, adenopathy, no JVD  Chest:  Full & symmetric excursion, no increased effort, breath grossly sounds clear  Cardiovascular:  Regular rhythm, S1, S2, no murmur  Abdomen:  Soft, non-tender, non-distended, normoactive bowel sound  Extremities: +2 b/l lower leg edema.  Skin:  No rash. Skin is warm/dry  Neuro/Psych:  Awake, confused.     LABORATORY:                          10.2   6.38  )-----------( 258      ( 24 Aug 2018 08:24 )             33.8     08-24    141  |  104  |  27<H>  ----------------------------<  89  4.6   |  31  |  1.80<H>    Ca    9.8      24 Aug 2018 08:24    TPro  8.7<H>  /  Alb  3.4  /  TBili  0.4  /  DBili  x   /  AST  37  /  ALT  29  /  AlkPhos  61      ABG - ( 23 Aug 2018 18:49 )  pH, Arterial: x     pH, Blood: 7.41  /  pCO2: 45    /  pO2: 77    / HCO3: 28    / Base Excess: 3.3   /  SaO2: 96          CARDIAC MARKERS ( 23 Aug 2018 16:37 )  .027 ng/mL / x     / x     / x     / 1.7 ng/mL      LIVER FUNCTIONS - ( 23 Aug 2018 16:37 )  Alb: 3.4 g/dL / Pro: 8.7 gm/dL / ALK PHOS: 61 U/L / ALT: 29 U/L / AST: 37 U/L / GGT: x             Urinalysis Basic - ( 23 Aug 2018 17:38 )    Color: Yellow / Appearance: very cloudy / S.010 / pH: x  Gluc: x / Ketone: Negative  / Bili: Negative / Urobili: Negative mg/dL   Blood: x / Protein: 30 mg/dL / Nitrite: Positive   Leuk Esterase: Moderate / RBC: 6-10 /HPF / WBC >50   Sq Epi: x / Non Sq Epi: Few / Bacteria: TNTC      BNPSerum Pro-Brain Natriuretic Peptide: 4128 pg/mL (18 @ 08:24)  Serum Pro-Brain Natriuretic Peptide: 5054 pg/mL (18 @ 16:37)      IMAGING:  < from: Xray Chest 1 View AP/PA. (18 @ 16:38) >  IMPRESSION:  Study limited due to rotation.    No gross consolidation or pleural effusion.    Heart size cannot be accurately assessed in this projection.    < end of copied text >      ASSESSMENT:  Pt is a 89 yo lady with a pmhx of HTN, HLD, CHF, breast ca, dementia who presents to the ED with respiratory difficulty. Family friend and health attendant at bedside says she had episode of difficulty breathing, at home. EMS reportedly found O2 at 80s. Pt nonverbal at baseline. Renal insufficiency. SOB/elevated BNP and findings consistent with HF unclear type. Echo pending to help clarify.    PLAN:     Fluid and salt restriction.    MEDICATIONS  (STANDING):  anastrozole 1 milliGRAM(s) Oral daily    cholestyramine Powder (Sugar-Free) 4 Gram(s) Oral three times a day  docusate sodium 100 milliGRAM(s) Oral three times a day  furosemide   Injectable 40 milliGRAM(s) IV Push every 12 hours  heparin  Injectable 5000 Unit(s) SubCutaneous every 12 hours  losartan 25 milliGRAM(s) Oral daily  memantine 10 milliGRAM(s) Oral two times a day  multivitamin 1 Tablet(s) Oral daily  sertraline 25 milliGRAM(s) Oral daily    Echo pending.  F/u labs.    Herb García MD, FACC, FASKEENAN, FASNC, FACP  Director, Heart Failure Services  [facility]  , Department of Cardiology  [facility]

## 2018-08-25 LAB
-  AMIKACIN: SIGNIFICANT CHANGE UP
-  AMOXICILLIN/CLAVULANIC ACID: SIGNIFICANT CHANGE UP
-  AMPICILLIN/SULBACTAM: SIGNIFICANT CHANGE UP
-  AMPICILLIN: SIGNIFICANT CHANGE UP
-  AZTREONAM: SIGNIFICANT CHANGE UP
-  CEFAZOLIN: SIGNIFICANT CHANGE UP
-  CEFEPIME: SIGNIFICANT CHANGE UP
-  CEFOXITIN: SIGNIFICANT CHANGE UP
-  CEFTRIAXONE: SIGNIFICANT CHANGE UP
-  CIPROFLOXACIN: SIGNIFICANT CHANGE UP
-  ERTAPENEM: SIGNIFICANT CHANGE UP
-  GENTAMICIN: SIGNIFICANT CHANGE UP
-  IMIPENEM: SIGNIFICANT CHANGE UP
-  LEVOFLOXACIN: SIGNIFICANT CHANGE UP
-  MEROPENEM: SIGNIFICANT CHANGE UP
-  NITROFURANTOIN: SIGNIFICANT CHANGE UP
-  PIPERACILLIN/TAZOBACTAM: SIGNIFICANT CHANGE UP
-  TIGECYCLINE: SIGNIFICANT CHANGE UP
-  TOBRAMYCIN: SIGNIFICANT CHANGE UP
-  TRIMETHOPRIM/SULFAMETHOXAZOLE: SIGNIFICANT CHANGE UP
ANION GAP SERPL CALC-SCNC: 10 MMOL/L — SIGNIFICANT CHANGE UP (ref 5–17)
BUN SERPL-MCNC: 36 MG/DL — HIGH (ref 7–23)
CALCIUM SERPL-MCNC: 9.7 MG/DL — SIGNIFICANT CHANGE UP (ref 8.5–10.1)
CHLORIDE SERPL-SCNC: 102 MMOL/L — SIGNIFICANT CHANGE UP (ref 96–108)
CO2 SERPL-SCNC: 28 MMOL/L — SIGNIFICANT CHANGE UP (ref 22–31)
CREAT SERPL-MCNC: 1.72 MG/DL — HIGH (ref 0.5–1.3)
CULTURE RESULTS: SIGNIFICANT CHANGE UP
GLUCOSE SERPL-MCNC: 95 MG/DL — SIGNIFICANT CHANGE UP (ref 70–99)
HCT VFR BLD CALC: 32 % — LOW (ref 34.5–45)
HGB BLD-MCNC: 9.9 G/DL — LOW (ref 11.5–15.5)
MCHC RBC-ENTMCNC: 29.4 PG — SIGNIFICANT CHANGE UP (ref 27–34)
MCHC RBC-ENTMCNC: 30.9 GM/DL — LOW (ref 32–36)
MCV RBC AUTO: 95 FL — SIGNIFICANT CHANGE UP (ref 80–100)
METHOD TYPE: SIGNIFICANT CHANGE UP
NRBC # BLD: 0 /100 WBCS — SIGNIFICANT CHANGE UP (ref 0–0)
ORGANISM # SPEC MICROSCOPIC CNT: SIGNIFICANT CHANGE UP
ORGANISM # SPEC MICROSCOPIC CNT: SIGNIFICANT CHANGE UP
PLATELET # BLD AUTO: 274 K/UL — SIGNIFICANT CHANGE UP (ref 150–400)
POTASSIUM SERPL-MCNC: 4.4 MMOL/L — SIGNIFICANT CHANGE UP (ref 3.5–5.3)
POTASSIUM SERPL-SCNC: 4.4 MMOL/L — SIGNIFICANT CHANGE UP (ref 3.5–5.3)
RBC # BLD: 3.37 M/UL — LOW (ref 3.8–5.2)
RBC # FLD: 13.7 % — SIGNIFICANT CHANGE UP (ref 10.3–14.5)
SODIUM SERPL-SCNC: 140 MMOL/L — SIGNIFICANT CHANGE UP (ref 135–145)
SPECIMEN SOURCE: SIGNIFICANT CHANGE UP
WBC # BLD: 7.55 K/UL — SIGNIFICANT CHANGE UP (ref 3.8–10.5)
WBC # FLD AUTO: 7.55 K/UL — SIGNIFICANT CHANGE UP (ref 3.8–10.5)

## 2018-08-25 PROCEDURE — 99233 SBSQ HOSP IP/OBS HIGH 50: CPT

## 2018-08-25 PROCEDURE — 99232 SBSQ HOSP IP/OBS MODERATE 35: CPT

## 2018-08-25 RX ADMIN — CEFTRIAXONE 100 GRAM(S): 500 INJECTION, POWDER, FOR SOLUTION INTRAMUSCULAR; INTRAVENOUS at 12:08

## 2018-08-25 RX ADMIN — Medication 40 MILLIGRAM(S): at 06:09

## 2018-08-25 RX ADMIN — CHOLESTYRAMINE 4 GRAM(S): 4 POWDER, FOR SUSPENSION ORAL at 06:09

## 2018-08-25 RX ADMIN — LOSARTAN POTASSIUM 25 MILLIGRAM(S): 100 TABLET, FILM COATED ORAL at 06:09

## 2018-08-25 RX ADMIN — CHOLESTYRAMINE 4 GRAM(S): 4 POWDER, FOR SUSPENSION ORAL at 13:40

## 2018-08-25 RX ADMIN — Medication 100 MILLIGRAM(S): at 13:40

## 2018-08-25 RX ADMIN — ANASTROZOLE 1 MILLIGRAM(S): 1 TABLET ORAL at 11:09

## 2018-08-25 RX ADMIN — HEPARIN SODIUM 5000 UNIT(S): 5000 INJECTION INTRAVENOUS; SUBCUTANEOUS at 18:41

## 2018-08-25 RX ADMIN — HEPARIN SODIUM 5000 UNIT(S): 5000 INJECTION INTRAVENOUS; SUBCUTANEOUS at 06:09

## 2018-08-25 RX ADMIN — Medication 650 MILLIGRAM(S): at 12:11

## 2018-08-25 RX ADMIN — Medication 650 MILLIGRAM(S): at 18:41

## 2018-08-25 RX ADMIN — MEMANTINE HYDROCHLORIDE 10 MILLIGRAM(S): 10 TABLET ORAL at 18:42

## 2018-08-25 RX ADMIN — Medication 650 MILLIGRAM(S): at 11:11

## 2018-08-25 RX ADMIN — SERTRALINE 25 MILLIGRAM(S): 25 TABLET, FILM COATED ORAL at 11:08

## 2018-08-25 RX ADMIN — MEMANTINE HYDROCHLORIDE 10 MILLIGRAM(S): 10 TABLET ORAL at 06:09

## 2018-08-25 RX ADMIN — Medication 1 TABLET(S): at 11:08

## 2018-08-25 RX ADMIN — Medication 40 MILLIGRAM(S): at 18:41

## 2018-08-25 RX ADMIN — Medication 100 MILLIGRAM(S): at 06:09

## 2018-08-26 LAB
ANION GAP SERPL CALC-SCNC: 8 MMOL/L — SIGNIFICANT CHANGE UP (ref 5–17)
BUN SERPL-MCNC: 40 MG/DL — HIGH (ref 7–23)
CALCIUM SERPL-MCNC: 9.2 MG/DL — SIGNIFICANT CHANGE UP (ref 8.5–10.1)
CHLORIDE SERPL-SCNC: 104 MMOL/L — SIGNIFICANT CHANGE UP (ref 96–108)
CO2 SERPL-SCNC: 29 MMOL/L — SIGNIFICANT CHANGE UP (ref 22–31)
CREAT SERPL-MCNC: 2.02 MG/DL — HIGH (ref 0.5–1.3)
GLUCOSE SERPL-MCNC: 91 MG/DL — SIGNIFICANT CHANGE UP (ref 70–99)
HCT VFR BLD CALC: 32.7 % — LOW (ref 34.5–45)
HGB BLD-MCNC: 9.9 G/DL — LOW (ref 11.5–15.5)
MCHC RBC-ENTMCNC: 28.9 PG — SIGNIFICANT CHANGE UP (ref 27–34)
MCHC RBC-ENTMCNC: 30.3 GM/DL — LOW (ref 32–36)
MCV RBC AUTO: 95.3 FL — SIGNIFICANT CHANGE UP (ref 80–100)
NRBC # BLD: 0 /100 WBCS — SIGNIFICANT CHANGE UP (ref 0–0)
PLATELET # BLD AUTO: 237 K/UL — SIGNIFICANT CHANGE UP (ref 150–400)
POTASSIUM SERPL-MCNC: 4.5 MMOL/L — SIGNIFICANT CHANGE UP (ref 3.5–5.3)
POTASSIUM SERPL-SCNC: 4.5 MMOL/L — SIGNIFICANT CHANGE UP (ref 3.5–5.3)
RBC # BLD: 3.43 M/UL — LOW (ref 3.8–5.2)
RBC # FLD: 13.7 % — SIGNIFICANT CHANGE UP (ref 10.3–14.5)
SODIUM SERPL-SCNC: 141 MMOL/L — SIGNIFICANT CHANGE UP (ref 135–145)
WBC # BLD: 8.53 K/UL — SIGNIFICANT CHANGE UP (ref 3.8–10.5)
WBC # FLD AUTO: 8.53 K/UL — SIGNIFICANT CHANGE UP (ref 3.8–10.5)

## 2018-08-26 PROCEDURE — 99233 SBSQ HOSP IP/OBS HIGH 50: CPT

## 2018-08-26 RX ADMIN — MEMANTINE HYDROCHLORIDE 10 MILLIGRAM(S): 10 TABLET ORAL at 06:03

## 2018-08-26 RX ADMIN — Medication 100 MILLIGRAM(S): at 06:03

## 2018-08-26 RX ADMIN — CHOLESTYRAMINE 4 GRAM(S): 4 POWDER, FOR SUSPENSION ORAL at 13:23

## 2018-08-26 RX ADMIN — SERTRALINE 25 MILLIGRAM(S): 25 TABLET, FILM COATED ORAL at 11:09

## 2018-08-26 RX ADMIN — MEMANTINE HYDROCHLORIDE 10 MILLIGRAM(S): 10 TABLET ORAL at 17:18

## 2018-08-26 RX ADMIN — Medication 650 MILLIGRAM(S): at 17:18

## 2018-08-26 RX ADMIN — Medication 1 TABLET(S): at 11:09

## 2018-08-26 RX ADMIN — Medication 650 MILLIGRAM(S): at 18:00

## 2018-08-26 RX ADMIN — HEPARIN SODIUM 5000 UNIT(S): 5000 INJECTION INTRAVENOUS; SUBCUTANEOUS at 06:03

## 2018-08-26 RX ADMIN — ANASTROZOLE 1 MILLIGRAM(S): 1 TABLET ORAL at 11:09

## 2018-08-26 RX ADMIN — CHOLESTYRAMINE 4 GRAM(S): 4 POWDER, FOR SUSPENSION ORAL at 06:03

## 2018-08-26 RX ADMIN — LOSARTAN POTASSIUM 25 MILLIGRAM(S): 100 TABLET, FILM COATED ORAL at 06:03

## 2018-08-26 RX ADMIN — Medication 40 MILLIGRAM(S): at 17:17

## 2018-08-26 RX ADMIN — HEPARIN SODIUM 5000 UNIT(S): 5000 INJECTION INTRAVENOUS; SUBCUTANEOUS at 17:17

## 2018-08-26 RX ADMIN — CEFTRIAXONE 100 GRAM(S): 500 INJECTION, POWDER, FOR SOLUTION INTRAMUSCULAR; INTRAVENOUS at 11:09

## 2018-08-26 RX ADMIN — Medication 650 MILLIGRAM(S): at 11:05

## 2018-08-26 RX ADMIN — Medication 40 MILLIGRAM(S): at 06:03

## 2018-08-26 RX ADMIN — Medication 650 MILLIGRAM(S): at 10:17

## 2018-08-26 RX ADMIN — Medication 100 MILLIGRAM(S): at 13:23

## 2018-08-27 LAB
ANION GAP SERPL CALC-SCNC: 10 MMOL/L — SIGNIFICANT CHANGE UP (ref 5–17)
BUN SERPL-MCNC: 46 MG/DL — HIGH (ref 7–23)
CALCIUM SERPL-MCNC: 9.6 MG/DL — SIGNIFICANT CHANGE UP (ref 8.5–10.1)
CHLORIDE SERPL-SCNC: 104 MMOL/L — SIGNIFICANT CHANGE UP (ref 96–108)
CO2 SERPL-SCNC: 27 MMOL/L — SIGNIFICANT CHANGE UP (ref 22–31)
CREAT SERPL-MCNC: 2.22 MG/DL — HIGH (ref 0.5–1.3)
GLUCOSE SERPL-MCNC: 84 MG/DL — SIGNIFICANT CHANGE UP (ref 70–99)
HCT VFR BLD CALC: 34 % — LOW (ref 34.5–45)
HGB BLD-MCNC: 10.2 G/DL — LOW (ref 11.5–15.5)
MCHC RBC-ENTMCNC: 29.3 PG — SIGNIFICANT CHANGE UP (ref 27–34)
MCHC RBC-ENTMCNC: 30 GM/DL — LOW (ref 32–36)
MCV RBC AUTO: 97.7 FL — SIGNIFICANT CHANGE UP (ref 80–100)
NRBC # BLD: 0 /100 WBCS — SIGNIFICANT CHANGE UP (ref 0–0)
PLATELET # BLD AUTO: 250 K/UL — SIGNIFICANT CHANGE UP (ref 150–400)
POTASSIUM SERPL-MCNC: 4.7 MMOL/L — SIGNIFICANT CHANGE UP (ref 3.5–5.3)
POTASSIUM SERPL-SCNC: 4.7 MMOL/L — SIGNIFICANT CHANGE UP (ref 3.5–5.3)
RBC # BLD: 3.48 M/UL — LOW (ref 3.8–5.2)
RBC # FLD: 13.9 % — SIGNIFICANT CHANGE UP (ref 10.3–14.5)
SODIUM SERPL-SCNC: 141 MMOL/L — SIGNIFICANT CHANGE UP (ref 135–145)
WBC # BLD: 8.24 K/UL — SIGNIFICANT CHANGE UP (ref 3.8–10.5)
WBC # FLD AUTO: 8.24 K/UL — SIGNIFICANT CHANGE UP (ref 3.8–10.5)

## 2018-08-27 PROCEDURE — 99232 SBSQ HOSP IP/OBS MODERATE 35: CPT

## 2018-08-27 PROCEDURE — 99233 SBSQ HOSP IP/OBS HIGH 50: CPT

## 2018-08-27 RX ORDER — FUROSEMIDE 40 MG
40 TABLET ORAL DAILY
Qty: 0 | Refills: 0 | Status: DISCONTINUED | OUTPATIENT
Start: 2018-08-27 | End: 2018-08-27

## 2018-08-27 RX ADMIN — Medication 100 MILLIGRAM(S): at 13:57

## 2018-08-27 RX ADMIN — ANASTROZOLE 1 MILLIGRAM(S): 1 TABLET ORAL at 12:05

## 2018-08-27 RX ADMIN — MEMANTINE HYDROCHLORIDE 10 MILLIGRAM(S): 10 TABLET ORAL at 06:08

## 2018-08-27 RX ADMIN — Medication 40 MILLIGRAM(S): at 06:08

## 2018-08-27 RX ADMIN — HEPARIN SODIUM 5000 UNIT(S): 5000 INJECTION INTRAVENOUS; SUBCUTANEOUS at 06:08

## 2018-08-27 RX ADMIN — CHOLESTYRAMINE 4 GRAM(S): 4 POWDER, FOR SUSPENSION ORAL at 06:07

## 2018-08-27 RX ADMIN — LOSARTAN POTASSIUM 25 MILLIGRAM(S): 100 TABLET, FILM COATED ORAL at 06:08

## 2018-08-27 RX ADMIN — Medication 1 TABLET(S): at 12:05

## 2018-08-27 RX ADMIN — Medication 650 MILLIGRAM(S): at 18:10

## 2018-08-27 RX ADMIN — SENNA PLUS 2 TABLET(S): 8.6 TABLET ORAL at 22:38

## 2018-08-27 RX ADMIN — Medication 650 MILLIGRAM(S): at 19:10

## 2018-08-27 RX ADMIN — Medication 650 MILLIGRAM(S): at 10:40

## 2018-08-27 RX ADMIN — CEFTRIAXONE 100 GRAM(S): 500 INJECTION, POWDER, FOR SOLUTION INTRAMUSCULAR; INTRAVENOUS at 11:56

## 2018-08-27 RX ADMIN — Medication 100 MILLIGRAM(S): at 22:38

## 2018-08-27 RX ADMIN — HEPARIN SODIUM 5000 UNIT(S): 5000 INJECTION INTRAVENOUS; SUBCUTANEOUS at 18:11

## 2018-08-27 RX ADMIN — MEMANTINE HYDROCHLORIDE 10 MILLIGRAM(S): 10 TABLET ORAL at 18:11

## 2018-08-27 RX ADMIN — Medication 650 MILLIGRAM(S): at 11:40

## 2018-08-27 RX ADMIN — Medication 100 MILLIGRAM(S): at 06:07

## 2018-08-27 RX ADMIN — CHOLESTYRAMINE 4 GRAM(S): 4 POWDER, FOR SUSPENSION ORAL at 13:57

## 2018-08-27 RX ADMIN — SERTRALINE 25 MILLIGRAM(S): 25 TABLET, FILM COATED ORAL at 12:05

## 2018-08-27 RX ADMIN — CHOLESTYRAMINE 4 GRAM(S): 4 POWDER, FOR SUSPENSION ORAL at 22:38

## 2018-08-27 NOTE — PROGRESS NOTE ADULT - ASSESSMENT
89 yrs old female with PMH of HTN, HLD, CHF, breast ca, dementia who presents to the ED with shortness of breath found to have a urinary tract infection and elevated bnp
89 yrs old female with PMH of HTN, HLD, CHF, breast ca, dementia who presents to the ED with shortness of breath found to have a urinary tract infection and elevated bnp
89f nonverbal sent to the hospital with short of breath found to have a urinary tract infection and elevated bnp     IMPROVE VTE Individual Risk Assessment          RISK                                                          Points    [  ] Previous VTE                                                3    [  ] Thrombophilia                                             2    [  ] Lower limb paralysis                                    2        (unable to hold up >15 seconds)      [  ] Current Cancer                                             2         (within 6 months)    [ x ] Immobilization > 24 hrs                              1    [  ] ICU/CCU stay > 24 hours                            1    [ x ] Age > 60                                                    1    IMPROVE VTE Score 2_________
ASSESSMENT:  Pt is a 89 yo lady with a PMHX of HTN, HLD, CHF, breast ca, dementia who presents to the ED with respiratory difficulty. Family friend and health attendant at bedside says she had episode of difficulty breathing while sitting in chair. EMS reportedly found O2 at 80s. Pt nonverbal at baseline. Chronic renal insufficiency. SOB/elevated BNP noted, xray not consistent with significant heart failure. Echo shows normal LVEF.    PLAN:     Fluid and salt restriction.  Change to low dose oral loop diuretic.  No cardiac contraindication to discharge.   Not a candidate for aggressive measures, given age and dementia.
ASSESSMENT:  Pt is a 89 yo lady with a pmhx of HTN, HLD, CHF, breast ca, dementia who presents to the ED with respiratory difficulty. Family friend and health attendant at bedside says she had episode of difficulty breathing, at home. EMS reportedly found O2 at 80s. Pt nonverbal at baseline. Renal insufficiency. SOB/elevated BNP noted, xray not consistent with significant heart failure. Awaiting echo assessment, meanwhile continue current Rx.    PLAN:     Fluid and salt restriction.  Not a candidate for aggressive measures given age and dementia.
89 yrs old female with PMH of HTN, HLD, CHF, breast ca, dementia who presents to the ED with shortness of breath found to have a urinary tract infection and elevated bnp

## 2018-08-27 NOTE — PROGRESS NOTE ADULT - PROBLEM SELECTOR PROBLEM 5
Malignant neoplasm of female breast, unspecified estrogen receptor status, unspecified laterality, unspecified site of breast

## 2018-08-27 NOTE — PROGRESS NOTE ADULT - PROBLEM SELECTOR PLAN 1
Lasix 40 mg IVP q 12 hrly  losatan 25 mg po q daily  monitor I/O, fluid restriction, monitor renal functions while on diuretics   Nasal canula as needed to keep osat>94%  pending ECHO  cardiology consult appreciated/noted  per cardio, continue current treatment awaiting ECHO and patient is not a candidate for aggressive management due to age and dementia
Lasix 40 mg IVP q 12 hrly  monitor I/O, fluid restriction, monitor renal functions while on diuretics   Nasal canula as needed to keep osat>94%  cardiology consult appreciated   pending ECHO
check abg   treat empirically for congestive heart failure  cardiol;ogy consult
dc Lasix as her breathing has much improved   losatan 25 mg po q daily  monitor I/O, fluid restriction, monitor renal functions while on diuretics   Nasal canula as needed to keep osat>94%  ECHO shows normal EF   cardiology consult appreciated/noted  per cardio, candidate for aggressive management due to age and dementia

## 2018-08-27 NOTE — PROGRESS NOTE ADULT - PROBLEM SELECTOR PLAN 2
Ucx showed >100,000 E. Coli   continue ceftriaxone IV  follow sensitivity
Ucx showed >100,000 E. Coli sensitive to ceftriaxone  continue ceftriaxone IV
antibiotics
Ucx showed >100,000 E. Coli sensitive to ceftriaxone  continue ceftriaxone IV.  pt had low grade fever this afternoon.will trend

## 2018-08-27 NOTE — PROGRESS NOTE ADULT - PROBLEM SELECTOR PLAN 4
continue home meds
continue home meds  supportive care

## 2018-08-27 NOTE — PROGRESS NOTE ADULT - SUBJECTIVE AND OBJECTIVE BOX
CARDIOLOGY PROGRESS NOTE    CHIEF COMPLAINT:  Patient is a 89y old  Female with a pmhx of HTN, HLD, CHF, breast ca, dementia who presented to the ED with respiratory difficulty. Family friend and health attendant at bedside says she had episode of difficulty breathing, at home. EMS reportedly found O2 at 80s. Pt nonverbal at baseline.  No events noted O/N.    ALLERGIES:  No Known Allergies    Home Medications:  acetaminophen 325 mg oral tablet: 2 tab(s) orally every 6 hours, As needed, Mild Pain (1 - 3) (15 May 2017 07:12)  anastrozole 1 mg oral tablet: 1 tab(s) orally once a day (07 May 2017 20:32)  Benicar 20 mg oral tablet: 1 tab(s) orally once a day (07 May 2017 20:32)  bumetanide 1 mg oral tablet: 1 tab(s) orally once a day (07 May 2017 20:32)  cholestyramine 4 g/5 g oral powder for reconstitution:  orally 3 times a day (07 May 2017 20:32)  docusate sodium 100 mg oral capsule: 1 cap(s) orally 3 times a day (15 May 2017 07:11)  multivitamin: 1 tab(s)  once a day (07 May 2017 20:32)  Namenda 10 mg oral tablet: 1 tab(s) orally 2 times a day (07 May 2017 20:32)  oxyCODONE 5 mg oral tablet: 1 tab(s) orally every 4 hours, As needed, Moderate Pain (4 - 6) (15 May 2017 07:12)  Uloric 40 mg oral tablet: 1 tab(s) orally once a day (07 May 2017 20:32)  Zoloft 25 mg oral tablet: 1 tab(s) orally once a day (07 May 2017 20:32)      PAST MEDICAL & SURGICAL HISTORY:  Breast CA  Dementia  Hypertension  History of knee replacement procedure of left knee  History of knee replacement procedure of right knee      FAMILY HISTORY:  No pertinent family history in first degree relatives      SOCIAL HISTORY:  Non-smoker    REVIEW OF SYSTEMS:  Unable b/c of pt with dementia.    PHYSICAL EXAM:  Vital Signs Last 24 Hrs  T(C): 36.3 (25 Aug 2018 11:30), Max: 36.6 (24 Aug 2018 18:07)  T(F): 97.4 (25 Aug 2018 11:30), Max: 97.9 (24 Aug 2018 18:07)  HR: 68 (25 Aug 2018 11:30) (68 - 97)  BP: 116/94 (25 Aug 2018 11:30) (116/94 - 175/85)  BP(mean): --  RR: 18 (25 Aug 2018 11:30) (16 - 18)  SpO2: 95% (25 Aug 2018 11:30) (95% - 98%)      General:  Appears stated age, well-groomed, well-nourished, no distress  HEENT:  NC/AT, patent nares w/ pink mucosa, OP clear w/o lesions, conjunctivae clear, no thyromegaly, nodules, adenopathy, no JVD  Chest:  Full & symmetric excursion, no increased effort, breath grossly sounds clear  Cardiovascular:  Regular rhythm, S1, S2, no murmur  Abdomen:  Soft, non-tender, non-distended, normoactive bowel sound  Extremities: legs in boots, cannot be assessed.  Skin:  No rash. Skin is warm/dry  Neuro/Psych:  Awake, confused.     LABORATORY:                          10.2   6.38  )-----------( 258      ( 24 Aug 2018 08:24 )             33.8         140  |  102  |  36<H>  ----------------------------<  95  4.4   |  28  |  1.72<H>    Ca    9.7      25 Aug 2018 07:57    TPro  8.7<H>  /  Alb  3.4  /  TBili  0.4  /  DBili  x   /  AST  37  /  ALT  29  /  AlkPhos  61      141  |  104  |  27<H>  ----------------------------<  89  4.6   |  31  |  1.80<H>    Ca    9.8      24 Aug 2018 08:24    TPro  8.7<H>  /  Alb  3.4  /  TBili  0.4  /  DBili  x   /  AST  37  /  ALT  29  /  AlkPhos  61      ABG - ( 23 Aug 2018 18:49 )  pH, Arterial: x     pH, Blood: 7.41  /  pCO2: 45    /  pO2: 77    / HCO3: 28    / Base Excess: 3.3   /  SaO2: 96          CARDIAC MARKERS ( 23 Aug 2018 16:37 )  .027 ng/mL / x     / x     / x     / 1.7 ng/mL      LIVER FUNCTIONS - ( 23 Aug 2018 16:37 )  Alb: 3.4 g/dL / Pro: 8.7 gm/dL / ALK PHOS: 61 U/L / ALT: 29 U/L / AST: 37 U/L / GGT: x             Urinalysis Basic - ( 23 Aug 2018 17:38 )    Color: Yellow / Appearance: very cloudy / S.010 / pH: x  Gluc: x / Ketone: Negative  / Bili: Negative / Urobili: Negative mg/dL   Blood: x / Protein: 30 mg/dL / Nitrite: Positive   Leuk Esterase: Moderate / RBC: 6-10 /HPF / WBC >50   Sq Epi: x / Non Sq Epi: Few / Bacteria: TNTC      BNPSerum Pro-Brain Natriuretic Peptide: 4128 pg/mL (18 @ 08:24)  Serum Pro-Brain Natriuretic Peptide: 5054 pg/mL (18 @ 16:37)      IMAGING:  < from: Xray Chest 1 View AP/PA. (18 @ 16:38) >  IMPRESSION:  Study limited due to rotation.    No gross consolidation or pleural effusion.    Heart size cannot be accurately assessed in this projection.    < end of copied text >      TTE     < from: TTE Echo Doppler w/o Cont (18 @ 08:07) >     EXAM:  TTE WO CON COMPLETE W DOPPL         PROCEDURE DATE:  2018        INTERPRETATION:  REPORT:    TRANSTHORACIC ECHOCARDIOGRAM REPORT         Patient Name:   ARA CHIN Patient Location: [facility] Rec #:  [id number]       Accession #:      [id number]  Account #:                       Height:           66.9 in 170.0 cm  YOB: 1925        Weight:           170.0 lb 77.10 kg  Patient Age:    89 years         BSA:              1.89 m²  Patient Gender: F                BP:               131/59 mmHg       Date of Exam: 2018 8:07:32 AM  Sonographer:  FLORENTIN    Procedure:     2D Echo/Doppler/Color Doppler Complete.  Indications:   Dyspnea, unspecified - R06.00  Diagnosis:     Dyspnea, unspecified - R06.00  Study Details: Technically difficult study. Study quality was adversely   affected                 due to body habitus.         2D AND M-MODE MEASUREMENTS (normal ranges within parentheses):  Left Ventricle:                  Normal   Aorta/Left Atrium:            Normal  IVSd (2D):              0.97 cm (0.7-1.1) Aortic Root (2D):  3.35 cm   (2.4-3.7)  LVPWd (2D):             0.99 cm (0.7-1.1) Left Atrium (2D):  3.67 cm   (1.9-4.0)  LVIDd (2D):             4.06 cm (3.4-5.7) Right Ventricle:  LVIDs (2D):     2.88 cm           TAPSE:           0.80 cm  LV FS (2D):             29.1 %   (>25%)  Relative Wall Thickness  0.49    (<0.42)    LV DIASTOLIC FUNCTION:  MV Peak E: 0.59 m/s Decel Time: 307 msec  MV Peak A: 0.94 m/s  E/A Ratio: 0.63    SPECTRAL DOPPLER ANALYSIS (where applicable):  Mitral Valve:  MV P1/2 Time: 88.93 msec  MV Area, PHT: 2.47 cm²    Aortic Valve: AoV Max Pascual: 1.86 m/s AoV Peak P.9 mmHg AoV Mean P.3 mmHg    LVOT Vmax: 0.99 m/s LVOT VTI: 0.198 m LVOT Diameter:    Tricuspid Valve and PA/RV Systolic Pressure: TR Max Velocity: 1.25 m/s RA   Pressure: 5 mmHg RVSP/PASP: 11.2 mmHg       PHYSICIAN INTERPRETATION:  Left Ventricle: Normal left ventricular size and wall thicknesses, with   normal systolic function.  Left ventricular ejection fraction, by visual estimation, is 65 to 70%.   Spectral Doppler shows impaired relaxation pattern of left ventricular   myocardial filling (Grade I diastolic dysfunction).  Right Ventricle: Normal right ventricular size and function.  Left Atrium: The left atrium is normal in size. Normal left atrial size.  Right Atrium: The right atrium is normal in size. Normal right atrial   size.  Pericardium: There is no evidence of pericardial effusion.  Mitral Valve: Structurally normal mitral valve, with normal leaflet   excursion. Thickening and calcification of the anterior and posterior   mitral valve leaflets. No evidence of mitral valve regurgitation is seen.  Tricuspid Valve: Structurally normal tricuspid valve, with normal leaflet   excursion. No tricuspid regurgitation is visualized.  Aortic Valve: Normal trileaflet aortic valve with normal opening.   Sclerotic aortic valve with normal opening. Peak Av velocity is 1.86 m/s,   mean transaortic gradient equals 5.3 mmHg. Theaortic valve mean gradient   is 5.3 mmHgconsistent with normally opening aortic valve. No evidence of   aortic valve regurgitation is seen.  Pulmonic Valve: Structurally normal pulmonic valve, with normal leaflet   excursion. No indication of pulmonic valve regurgitation.  Aorta: The aortic root and ascending aorta are structurally normal, with   no evidence of dilitation.  Pulmonary Artery: The main pulmonary artery is normal in size.       Summary:   1. Left ventricular ejection fraction, by visual estimation, is 65 to   70%.   2. Spectral Doppler shows impaired relaxation pattern of left   ventricular myocardial filling (Grade I diastolic dysfunction).   3. Thickening and calcification of the anterior and posterior mitral   valve leaflets.   4. Sclerotic aortic valve with normal opening.   5. Otherwise normal echocardiogram.    [id number] Markel Reddy MD, FACC  Electronically signed on 2018 at 11:16:07 PM
CARDIOLOGY PROGRESS NOTE    CHIEF COMPLAINT:  Patient is a 89y old  Female with a pmhx of HTN, HLD, CHF, breast ca, dementia who presented to the ED with respiratory difficulty. Family friend and health attendant at bedside says she had episode of difficulty breathing, at home. EMS reportedly found O2 at 80s. Pt nonverbal at baseline.  No events noted O/N.    ALLERGIES:  No Known Allergies    Home Medications:  acetaminophen 325 mg oral tablet: 2 tab(s) orally every 6 hours, As needed, Mild Pain (1 - 3) (15 May 2017 07:12)  anastrozole 1 mg oral tablet: 1 tab(s) orally once a day (07 May 2017 20:32)  Benicar 20 mg oral tablet: 1 tab(s) orally once a day (07 May 2017 20:32)  bumetanide 1 mg oral tablet: 1 tab(s) orally once a day (07 May 2017 20:32)  cholestyramine 4 g/5 g oral powder for reconstitution:  orally 3 times a day (07 May 2017 20:32)  docusate sodium 100 mg oral capsule: 1 cap(s) orally 3 times a day (15 May 2017 07:11)  multivitamin: 1 tab(s)  once a day (07 May 2017 20:32)  Namenda 10 mg oral tablet: 1 tab(s) orally 2 times a day (07 May 2017 20:32)  oxyCODONE 5 mg oral tablet: 1 tab(s) orally every 4 hours, As needed, Moderate Pain (4 - 6) (15 May 2017 07:12)  Uloric 40 mg oral tablet: 1 tab(s) orally once a day (07 May 2017 20:32)  Zoloft 25 mg oral tablet: 1 tab(s) orally once a day (07 May 2017 20:32)      PAST MEDICAL & SURGICAL HISTORY:  Breast CA  Dementia  Hypertension  History of knee replacement procedure of left knee  History of knee replacement procedure of right knee      FAMILY HISTORY:  No pertinent family history in first degree relatives      SOCIAL HISTORY:  Non-smoker    REVIEW OF SYSTEMS:  Unable b/c of pt with dementia.    PHYSICAL EXAM:  Vital Signs Last 24 Hrs  T(C): 36.3 (25 Aug 2018 11:30), Max: 36.6 (24 Aug 2018 18:07)  T(F): 97.4 (25 Aug 2018 11:30), Max: 97.9 (24 Aug 2018 18:07)  HR: 68 (25 Aug 2018 11:30) (68 - 97)  BP: 116/94 (25 Aug 2018 11:30) (116/94 - 175/85)  BP(mean): --  RR: 18 (25 Aug 2018 11:30) (16 - 18)  SpO2: 95% (25 Aug 2018 11:30) (95% - 98%)      General:  Appears stated age, well-groomed, well-nourished, no distress  HEENT:  NC/AT, patent nares w/ pink mucosa, OP clear w/o lesions, conjunctivae clear, no thyromegaly, nodules, adenopathy, no JVD  Chest:  Full & symmetric excursion, no increased effort, breath grossly sounds clear  Cardiovascular:  Regular rhythm, S1, S2, no murmur  Abdomen:  Soft, non-tender, non-distended, normoactive bowel sound  Extremities: legs in boots, cannot be assessed.  Skin:  No rash. Skin is warm/dry  Neuro/Psych:  Awake, confused.     LABORATORY:                          10.2   6.38  )-----------( 258      ( 24 Aug 2018 08:24 )             33.8         140  |  102  |  36<H>  ----------------------------<  95  4.4   |  28  |  1.72<H>    Ca    9.7      25 Aug 2018 07:57    TPro  8.7<H>  /  Alb  3.4  /  TBili  0.4  /  DBili  x   /  AST  37  /  ALT  29  /  AlkPhos  61      141  |  104  |  27<H>  ----------------------------<  89  4.6   |  31  |  1.80<H>    Ca    9.8      24 Aug 2018 08:24    TPro  8.7<H>  /  Alb  3.4  /  TBili  0.4  /  DBili  x   /  AST  37  /  ALT  29  /  AlkPhos  61      ABG - ( 23 Aug 2018 18:49 )  pH, Arterial: x     pH, Blood: 7.41  /  pCO2: 45    /  pO2: 77    / HCO3: 28    / Base Excess: 3.3   /  SaO2: 96          CARDIAC MARKERS ( 23 Aug 2018 16:37 )  .027 ng/mL / x     / x     / x     / 1.7 ng/mL      LIVER FUNCTIONS - ( 23 Aug 2018 16:37 )  Alb: 3.4 g/dL / Pro: 8.7 gm/dL / ALK PHOS: 61 U/L / ALT: 29 U/L / AST: 37 U/L / GGT: x             Urinalysis Basic - ( 23 Aug 2018 17:38 )    Color: Yellow / Appearance: very cloudy / S.010 / pH: x  Gluc: x / Ketone: Negative  / Bili: Negative / Urobili: Negative mg/dL   Blood: x / Protein: 30 mg/dL / Nitrite: Positive   Leuk Esterase: Moderate / RBC: 6-10 /HPF / WBC >50   Sq Epi: x / Non Sq Epi: Few / Bacteria: TNTC      BNPSerum Pro-Brain Natriuretic Peptide: 4128 pg/mL (18 @ 08:24)  Serum Pro-Brain Natriuretic Peptide: 5054 pg/mL (18 @ 16:37)      IMAGING:  < from: Xray Chest 1 View AP/PA. (18 @ 16:38) >  IMPRESSION:  Study limited due to rotation.    No gross consolidation or pleural effusion.    Heart size cannot be accurately assessed in this projection.    < end of copied text >      TTE pending
CHIEF COMPLAINT/INTERVAL HISTORY:    Patient is a 89y old  Female who presents with a chief complaint of     HPI:  Pt is a 89 yo lady with a pmhx of HTN, HL, CHF, breast ca, dementia who presents to the ED with respiratory difficulty. Son says she had episode of difficulty breathing, at home. EMS reportedly found O2 at 80s. Pt nonverbal at baseline. Pt has no fevers or cough per son as per emergency room - attempted to call the sson but was unavailable (23 Aug 2018 18:28)    Overnight issues  no visible short of breath   waiting on echocardiagram and cardiology consult   SUBJECTIVE & OBJECTIVE: Pt seen and examined at bedside.   ROS:  dementia  ICU Vital Signs Last 24 Hrs  T(C): 37 (24 Aug 2018 05:30), Max: 37.1 (24 Aug 2018 00:14)  T(F): 98.6 (24 Aug 2018 05:30), Max: 98.8 (24 Aug 2018 00:14)  HR: 93 (24 Aug 2018 05:30) (78 - 130)  BP: 143/53 (24 Aug 2018 05:30) (130/82 - 200/86)  BP(mean): --  ABP: --  ABP(mean): --  RR: 18 (24 Aug 2018 05:30) (16 - 18)  SpO2: 98% (24 Aug 2018 05:30) (90% - 100%)        MEDICATIONS  (STANDING):  anastrozole 1 milliGRAM(s) Oral daily  cefTRIAXone   IVPB      cefTRIAXone   IVPB 1 Gram(s) IV Intermittent once  cholestyramine Powder (Sugar-Free) 4 Gram(s) Oral three times a day  docusate sodium 100 milliGRAM(s) Oral three times a day  furosemide   Injectable 40 milliGRAM(s) IV Push every 12 hours  heparin  Injectable 5000 Unit(s) SubCutaneous every 12 hours  losartan 25 milliGRAM(s) Oral daily  memantine 10 milliGRAM(s) Oral two times a day  multivitamin 1 Tablet(s) Oral daily  sertraline 25 milliGRAM(s) Oral daily    MEDICATIONS  (PRN):        PHYSICAL EXAM:    GENERAL: NAD, well-groomed, well-developed  HEAD:  Atraumatic, Normocephalic  EYES: EOMI, PERRLA, conjunctiva and sclera clear  ENMT: Moist mucous membranes  NECK: Supple, No JVD  NERVOUS SYSTEM:  Alert & Oriented X3, Motor Strength 5/5 B/L upper and lower extremities; DTRs 2+ intact and symmetric  CHEST/LUNG: Clear to auscultation bilaterally; No rales, rhonchi, wheezing, or rubs  HEART: Regular rate and rhythm; No murmurs, rubs, or gallops  ABDOMEN: Soft, Nontender, Nondistended; Bowel sounds present  EXTREMITIES:  2+ Peripheral Pulses, No clubbing, cyanosis, or edema    LABS:                        10.2   6.38  )-----------( 258      ( 24 Aug 2018 08:24 )             33.8     08-24    141  |  104  |  27<H>  ----------------------------<  89  4.6   |  31  |  1.80<H>    Ca    9.8      24 Aug 2018 08:24    TPro  8.7<H>  /  Alb  3.4  /  TBili  0.4  /  DBili  x   /  AST  37  /  ALT  29  /  AlkPhos  61        Urinalysis Basic - ( 23 Aug 2018 17:38 )    Color: Yellow / Appearance: very cloudy / S.010 / pH: x  Gluc: x / Ketone: Negative  / Bili: Negative / Urobili: Negative mg/dL   Blood: x / Protein: 30 mg/dL / Nitrite: Positive   Leuk Esterase: Moderate / RBC: 6-10 /HPF / WBC >50   Sq Epi: x / Non Sq Epi: Few / Bacteria: TNTC        CAPILLARY BLOOD GLUCOSE          RECENT CULTURES:      RADIOLOGY & ADDITIONAL TESTS:  Imaging Personally Reviewed:  [ ] YES      Consultant(s) Notes Reviewed:  [ ] YES     Care Discussed with [ ] Consultants [X ] Patient [ ] Family  [x ]    [x ]  Other; RN  HEALTH ISSUES - PROBLEM Dx:  Malignant neoplasm of female breast, unspecified estrogen receptor status, unspecified laterality, unspecified site of breast: Malignant neoplasm of female breast, unspecified estrogen receptor status, unspecified laterality, unspecified site of breast  Alzheimer's dementia without behavioral disturbance, unspecified timing of dementia onset: Alzheimer&#x27;s dementia without behavioral disturbance, unspecified timing of dementia onset  Hypertension, unspecified type: Hypertension, unspecified type  Urinary tract infection without hematuria, site unspecified: Urinary tract infection without hematuria, site unspecified  SOB (shortness of breath): SOB (shortness of breath)        DVT/GI ppx  Discussed with pt @ bedside
Patient is a 89y old  Female who presents with a chief complaint of     INTERVAL HPI/OVERNIGHT EVENTS: no acute events.  Patient laying bed comfortably in NAD, awake, alert, non verbal appears calm.   saturating well with nasal canula.     MEDICATIONS  (STANDING):  acetaminophen   Tablet. 650 milliGRAM(s) Oral <User Schedule>  anastrozole 1 milliGRAM(s) Oral daily  cefTRIAXone   IVPB      cefTRIAXone   IVPB 1 Gram(s) IV Intermittent every 24 hours  cholestyramine Powder (Sugar-Free) 4 Gram(s) Oral three times a day  docusate sodium 100 milliGRAM(s) Oral three times a day  furosemide   Injectable 40 milliGRAM(s) IV Push every 12 hours  heparin  Injectable 5000 Unit(s) SubCutaneous every 12 hours  losartan 25 milliGRAM(s) Oral daily  memantine 10 milliGRAM(s) Oral two times a day  multivitamin 1 Tablet(s) Oral daily  senna 2 Tablet(s) Oral at bedtime  sertraline 25 milliGRAM(s) Oral daily    MEDICATIONS  (PRN):  acetaminophen   Tablet 650 milliGRAM(s) Oral every 6 hours PRN For Temp greater than 38 C (100.4 F)      Allergies    No Known Allergies    Intolerances    Vital Signs Last 24 Hrs  T(C): 36.3 (25 Aug 2018 11:30), Max: 36.6 (24 Aug 2018 18:07)  T(F): 97.4 (25 Aug 2018 11:30), Max: 97.9 (24 Aug 2018 18:07)  HR: 68 (25 Aug 2018 11:30) (68 - 97)  BP: 116/94 (25 Aug 2018 11:30) (116/94 - 175/85)  BP(mean): --  RR: 18 (25 Aug 2018 11:30) (16 - 18)  SpO2: 95% (25 Aug 2018 11:30) (95% - 98%)    PHYSICAL EXAM:  GENERAL: elderly demented female in bed in NAD.  HEAD:  Atraumatic, Normocephalic  EYES: EOMI, PERRLA  ENMT:  Moist mucous membranes  NECK: Supple, No JVD, Normal thyroid  NERVOUS SYSTEM:  Alert and awake, demented, non verbal, no focal deficit   CHEST/LUNG: Clear to percussion bilaterally; No rales, rhonchi, wheezing  HEART: Regular rate and rhythm; No murmurs, rubs, or gallops  ABDOMEN: Soft, Nontender, Nondistended; Bowel sounds present  EXTREMITIES:  No edema    LABS:                        9.9    7.55  )-----------( 274      ( 25 Aug 2018 07:57 )             32.0     08-25    140  |  102  |  36<H>  ----------------------------<  95  4.4   |  28  |  1.72<H>    Ca    9.7      25 Aug 2018 07:57    TPro  8.7<H>  /  Alb  3.4  /  TBili  0.4  /  DBili  x   /  AST  37  /  ALT  29  /  AlkPhos  61  08-23      Urinalysis Basic - ( 23 Aug 2018 17:38 )    Color: Yellow / Appearance: very cloudy / S.010 / pH: x  Gluc: x / Ketone: Negative  / Bili: Negative / Urobili: Negative mg/dL   Blood: x / Protein: 30 mg/dL / Nitrite: Positive   Leuk Esterase: Moderate / RBC: 6-10 /HPF / WBC >50   Sq Epi: x / Non Sq Epi: Few / Bacteria: TNTC      CAPILLARY BLOOD GLUCOSE          Culture - Blood (collected 23 Aug 2018 23:27)  Source: .Blood Blood  Preliminary Report (25 Aug 2018 01:04):    No growth to date.    Culture - Blood (collected 23 Aug 2018 23:26)  Source: .Blood Blood  Preliminary Report (25 Aug 2018 01:04):    No growth to date.    Culture - Urine (collected 23 Aug 2018 23:23)  Source: .Urine Clean Catch (Midstream)  Preliminary Report (24 Aug 2018 19:20):    >100,000 CFU/ml Escherichia coli      RADIOLOGY & ADDITIONAL TESTS:    Imaging Personally Reviewed:  [ ] YES  [ ] NO    Consultant(s) Notes Reviewed:  [ ] YES  [ ] NO    Care Discussed with Consultants/Other Providers [ ] YES  [ ] NO
Patient is a 89y old  Female who presents with a chief complaint of     INTERVAL HPI/OVERNIGHT EVENTS: no acute overnight events noted.  Patient seen and examined at bed side in [facility], sleeping comfortably but awake on verbal responses, minimally verbal.   Saturating well on RA.     MEDICATIONS  (STANDING):  acetaminophen   Tablet. 650 milliGRAM(s) Oral <User Schedule>  anastrozole 1 milliGRAM(s) Oral daily  cefTRIAXone   IVPB      cefTRIAXone   IVPB 1 Gram(s) IV Intermittent every 24 hours  cholestyramine Powder (Sugar-Free) 4 Gram(s) Oral three times a day  docusate sodium 100 milliGRAM(s) Oral three times a day  furosemide   Injectable 40 milliGRAM(s) IV Push every 12 hours  heparin  Injectable 5000 Unit(s) SubCutaneous every 12 hours  losartan 25 milliGRAM(s) Oral daily  memantine 10 milliGRAM(s) Oral two times a day  multivitamin 1 Tablet(s) Oral daily  senna 2 Tablet(s) Oral at bedtime  sertraline 25 milliGRAM(s) Oral daily    MEDICATIONS  (PRN):  acetaminophen   Tablet 650 milliGRAM(s) Oral every 6 hours PRN For Temp greater than 38 C (100.4 F)      Allergies    No Known Allergies    Intolerances      Vital Signs Last 24 Hrs  T(C): 37.1 (26 Aug 2018 05:34), Max: 37.4 (26 Aug 2018 00:30)  T(F): 98.8 (26 Aug 2018 05:34), Max: 99.4 (26 Aug 2018 00:30)  HR: 89 (26 Aug 2018 05:34) (68 - 93)  BP: 131/59 (26 Aug 2018 05:34) (108/54 - 131/59)  BP(mean): --  RR: 18 (26 Aug 2018 05:34) (18 - 18)  SpO2: 96% (26 Aug 2018 05:34) (94% - 97%)    PHYSICAL EXAM:  GENERAL: elderly demented female in bed in NAD.  HEAD:  Atraumatic, Normocephalic  EYES: EOMI, PERRLA  ENMT:  Moist mucous membranes  NECK: Supple, No JVD, Normal thyroid  NERVOUS SYSTEM:  Alert and awake on verbal responses, baseline demented, minimally verbal, no focal deficit   CHEST/LUNG: b/l air entry good, No rales, rhonchi, wheezing  HEART: Regular rate and rhythm; No murmurs  ABDOMEN: Soft, Nontender, Nondistended; Bowel sounds present  EXTREMITIES:  No edema    LABS:                        9.9    8.53  )-----------( 237      ( 26 Aug 2018 07:45 )             32.7     08-26    141  |  104  |  40<H>  ----------------------------<  91  4.5   |  29  |  2.02<H>    Ca    9.2      26 Aug 2018 07:45          CAPILLARY BLOOD GLUCOSE          Culture - Blood (collected 23 Aug 2018 23:27)  Source: .Blood Blood  Preliminary Report (25 Aug 2018 01:04):    No growth to date.    Culture - Blood (collected 23 Aug 2018 23:26)  Source: .Blood Blood  Preliminary Report (25 Aug 2018 01:04):    No growth to date.    Culture - Urine (collected 23 Aug 2018 23:23)  Source: .Urine Clean Catch (Midstream)  Final Report (25 Aug 2018 17:18):    >100,000 CFU/ml Escherichia coli  Organism: Escherichia coli (25 Aug 2018 17:18)  Organism: Escherichia coli (25 Aug 2018 17:18)      RADIOLOGY & ADDITIONAL TESTS:    Imaging Personally Reviewed:  [ ] YES  [ ] NO    Consultant(s) Notes Reviewed:  [ ] YES  [ ] NO    Care Discussed with Consultants/Other Providers [ ] YES  [ ] NO
Patient is a 89y old  Female who presents with a chief complaint of     INTERVAL HPI/OVERNIGHT EVENTS: no acute overnight events noted.  Patient seen and examined at bed side in NAD, Saturating well on RA.     MEDICATIONS  (STANDING):  acetaminophen   Tablet. 650 milliGRAM(s) Oral <User Schedule>  anastrozole 1 milliGRAM(s) Oral daily  cefTRIAXone   IVPB      cefTRIAXone   IVPB 1 Gram(s) IV Intermittent every 24 hours  cholestyramine Powder (Sugar-Free) 4 Gram(s) Oral three times a day  docusate sodium 100 milliGRAM(s) Oral three times a day  heparin  Injectable 5000 Unit(s) SubCutaneous every 12 hours  losartan 25 milliGRAM(s) Oral daily  memantine 10 milliGRAM(s) Oral two times a day  multivitamin 1 Tablet(s) Oral daily  senna 2 Tablet(s) Oral at bedtime  sertraline 25 milliGRAM(s) Oral daily    MEDICATIONS  (PRN):  acetaminophen   Tablet 650 milliGRAM(s) Oral every 6 hours PRN For Temp greater than 38 C (100.4 F)      Allergies    No Known Allergies    Intolerances      Vital Signs Last 24 Hrs  T(C): 36.7 (27 Aug 2018 12:31), Max: 36.9 (26 Aug 2018 23:07)  T(F): 98 (27 Aug 2018 12:31), Max: 98.4 (26 Aug 2018 23:07)  HR: 89 (27 Aug 2018 14:54) (74 - 89)  BP: 126/69 (27 Aug 2018 14:54) (114/68 - 170/80)  BP(mean): --  RR: 16 (27 Aug 2018 14:54) (16 - 18)  SpO2: 96% (27 Aug 2018 14:54) (96% - 98%)    PHYSICAL EXAM:  GENERAL: elderly demented female in bed in NAD.  HEAD:  Atraumatic, Normocephalic  EYES: EOMI, PERRLA  ENMT:  Moist mucous membranes  NECK: Supple, No JVD, Normal thyroid  NERVOUS SYSTEM:  Alert and awake on verbal responses, baseline demented, no focal deficit   CHEST/LUNG: b/l air entry good, No rales, rhonchi, wheezing  HEART: Regular rate and rhythm; No murmurs  ABDOMEN: Soft, Nontender, Nondistended; Bowel sounds present  EXTREMITIES:  No edema    LABS:                        10.2   8.24  )-----------( 250      ( 27 Aug 2018 06:23 )             34.0     08-27    141  |  104  |  46<H>  ----------------------------<  84  4.7   |  27  |  2.22<H>    Ca    9.6      27 Aug 2018 06:23                CAPILLARY BLOOD GLUCOSE          Culture - Blood (collected 23 Aug 2018 23:27)  Source: .Blood Blood  Preliminary Report (25 Aug 2018 01:04):    No growth to date.    Culture - Blood (collected 23 Aug 2018 23:26)  Source: .Blood Blood  Preliminary Report (25 Aug 2018 01:04):    No growth to date.    Culture - Urine (collected 23 Aug 2018 23:23)  Source: .Urine Clean Catch (Midstream)  Final Report (25 Aug 2018 17:18):    >100,000 CFU/ml Escherichia coli  Organism: Escherichia coli (25 Aug 2018 17:18)  Organism: Escherichia coli (25 Aug 2018 17:18)      RADIOLOGY & ADDITIONAL TESTS:    Imaging Personally Reviewed:  [ ] YES  [ ] NO    Consultant(s) Notes Reviewed:  [ ] YES  [ ] NO    Care Discussed with Consultants/Other Providers [ ] YES  [ ] NO

## 2018-08-28 ENCOUNTER — TRANSCRIPTION ENCOUNTER (OUTPATIENT)
Age: 83
End: 2018-08-28

## 2018-08-28 VITALS — WEIGHT: 197.53 LBS

## 2018-08-28 LAB
ANION GAP SERPL CALC-SCNC: 9 MMOL/L — SIGNIFICANT CHANGE UP (ref 5–17)
BUN SERPL-MCNC: 50 MG/DL — HIGH (ref 7–23)
CALCIUM SERPL-MCNC: 9.6 MG/DL — SIGNIFICANT CHANGE UP (ref 8.5–10.1)
CHLORIDE SERPL-SCNC: 104 MMOL/L — SIGNIFICANT CHANGE UP (ref 96–108)
CO2 SERPL-SCNC: 28 MMOL/L — SIGNIFICANT CHANGE UP (ref 22–31)
CREAT SERPL-MCNC: 2.12 MG/DL — HIGH (ref 0.5–1.3)
GLUCOSE SERPL-MCNC: 114 MG/DL — HIGH (ref 70–99)
HCT VFR BLD CALC: 31.5 % — LOW (ref 34.5–45)
HGB BLD-MCNC: 9.3 G/DL — LOW (ref 11.5–15.5)
MCHC RBC-ENTMCNC: 28.5 PG — SIGNIFICANT CHANGE UP (ref 27–34)
MCHC RBC-ENTMCNC: 29.5 GM/DL — LOW (ref 32–36)
MCV RBC AUTO: 96.6 FL — SIGNIFICANT CHANGE UP (ref 80–100)
NRBC # BLD: 0 /100 WBCS — SIGNIFICANT CHANGE UP (ref 0–0)
PLATELET # BLD AUTO: 240 K/UL — SIGNIFICANT CHANGE UP (ref 150–400)
POTASSIUM SERPL-MCNC: 4.4 MMOL/L — SIGNIFICANT CHANGE UP (ref 3.5–5.3)
POTASSIUM SERPL-SCNC: 4.4 MMOL/L — SIGNIFICANT CHANGE UP (ref 3.5–5.3)
RBC # BLD: 3.26 M/UL — LOW (ref 3.8–5.2)
RBC # FLD: 13.7 % — SIGNIFICANT CHANGE UP (ref 10.3–14.5)
SODIUM SERPL-SCNC: 141 MMOL/L — SIGNIFICANT CHANGE UP (ref 135–145)
WBC # BLD: 8.8 K/UL — SIGNIFICANT CHANGE UP (ref 3.8–10.5)
WBC # FLD AUTO: 8.8 K/UL — SIGNIFICANT CHANGE UP (ref 3.8–10.5)

## 2018-08-28 PROCEDURE — 99239 HOSP IP/OBS DSCHRG MGMT >30: CPT

## 2018-08-28 RX ADMIN — HEPARIN SODIUM 5000 UNIT(S): 5000 INJECTION INTRAVENOUS; SUBCUTANEOUS at 18:05

## 2018-08-28 RX ADMIN — Medication 100 MILLIGRAM(S): at 06:47

## 2018-08-28 RX ADMIN — CHOLESTYRAMINE 4 GRAM(S): 4 POWDER, FOR SUSPENSION ORAL at 06:47

## 2018-08-28 RX ADMIN — Medication 650 MILLIGRAM(S): at 13:33

## 2018-08-28 RX ADMIN — MEMANTINE HYDROCHLORIDE 10 MILLIGRAM(S): 10 TABLET ORAL at 06:47

## 2018-08-28 RX ADMIN — Medication 1 TABLET(S): at 13:34

## 2018-08-28 RX ADMIN — MEMANTINE HYDROCHLORIDE 10 MILLIGRAM(S): 10 TABLET ORAL at 18:05

## 2018-08-28 RX ADMIN — Medication 100 MILLIGRAM(S): at 13:34

## 2018-08-28 RX ADMIN — Medication 650 MILLIGRAM(S): at 18:05

## 2018-08-28 RX ADMIN — ANASTROZOLE 1 MILLIGRAM(S): 1 TABLET ORAL at 13:34

## 2018-08-28 RX ADMIN — CEFTRIAXONE 100 GRAM(S): 500 INJECTION, POWDER, FOR SOLUTION INTRAMUSCULAR; INTRAVENOUS at 13:30

## 2018-08-28 RX ADMIN — HEPARIN SODIUM 5000 UNIT(S): 5000 INJECTION INTRAVENOUS; SUBCUTANEOUS at 06:47

## 2018-08-28 RX ADMIN — SERTRALINE 25 MILLIGRAM(S): 25 TABLET, FILM COATED ORAL at 13:34

## 2018-08-28 RX ADMIN — CHOLESTYRAMINE 4 GRAM(S): 4 POWDER, FOR SUSPENSION ORAL at 13:34

## 2018-08-28 RX ADMIN — LOSARTAN POTASSIUM 25 MILLIGRAM(S): 100 TABLET, FILM COATED ORAL at 06:47

## 2018-08-28 NOTE — DISCHARGE NOTE ADULT - OTHER SIGNIFICANT FINDINGS
< from: TTE Echo Doppler w/o Cont (08.26.18 @ 08:07) >  Summary:   1. Left ventricular ejection fraction, by visual estimation, is 65 to   70%.   2. Spectral Doppler shows impaired relaxation pattern of left   ventricular myocardial filling (Grade I diastolic dysfunction).   3. Thickening and calcification of the anterior and posterior mitral   valve leaflets.   4. Sclerotic aortic valve with normal opening.   5. Otherwise normal echocardiogram.    [id number] Markel Reddy MD, FACC  Electronically signed on 8/26/2018 at 11:16:07 PM    < end of copied text >

## 2018-08-28 NOTE — DISCHARGE NOTE ADULT - MEDICATION SUMMARY - MEDICATIONS TO STOP TAKING
I will STOP taking the medications listed below when I get home from the hospital:    oxyCODONE 5 mg oral tablet  -- 1 tab(s) by mouth every 4 hours, As needed, Moderate Pain (4 - 6)

## 2018-08-28 NOTE — DISCHARGE NOTE ADULT - CARE PLAN
Principal Discharge DX:	SOB (shortness of breath)  Goal:	follow up with pcp and cardiologist  Assessment and plan of treatment:	take medications as prescribed

## 2018-08-28 NOTE — DISCHARGE NOTE ADULT - HOSPITAL COURSE
89 yrs old female with PMH of HTN, HLD, CHF, breast ca, dementia who presents to the ED with shortness of breath found to have a urinary tract infection and elevated bnp. Pt was started on diureses and clinically improved. ECHO was wnl. trop negative. Family also c.o of "passing out for a few seconds" while at rest in the wheel chair for the last 2 months. Pt was asymptomatic during this admission. Pt can follow up with cardiology and neurology as an outpatient If patient passes out again would recommend returning to the emergency room.        Problem/Plan - 1:  ·  Problem: SOB (shortness of breath).  Plan: dc Lasix as her breathing has much improved   c/w losatan 25 mg po q daily  ECHO shows normal EF   seen by cardiology and in not a  candidate for aggressive management due to age and dementia and son agrees   if syncope returns      Problem/Plan - 2:  ·  Problem: Urinary tract infection without hematuria, site unspecified.  Plan: Ucx showed >100,000 E. Coli sensitive to ceftriaxone  continue ceftriaxone IV.  completed 5 days of abx       Problem/Plan - 3:  ·  Problem: Hypertension, unspecified type.  Plan: continue home meds.      Problem/Plan - 4:  ·  Problem: Alzheimer's dementia without behavioral disturbance, unspecified timing of dementia onset.  Plan: continue home meds  supportive care.      Problem/Plan - 5:  ·  Problem: Malignant neoplasm of female breast, unspecified estrogen receptor status, unspecified laterality, unspecified site of breast.  Plan: continue home meds.

## 2018-08-28 NOTE — DISCHARGE NOTE ADULT - MEDICATION SUMMARY - MEDICATIONS TO TAKE
I will START or STAY ON the medications listed below when I get home from the hospital:    acetaminophen 325 mg oral tablet  -- 2 tab(s) by mouth every 6 hours, As needed, Mild Pain (1 - 3)  -- Indication: For pain     Benicar 20 mg oral tablet  -- 1 tab(s) by mouth once a day  -- Indication: For Htn     Zoloft 25 mg oral tablet  -- 1 tab(s) by mouth once a day  -- Indication: For psych     cholestyramine 4 g/5 g oral powder for reconstitution  --  by mouth 3 times a day  -- Indication: For .    Uloric 40 mg oral tablet  -- 1 tab(s) by mouth once a day  -- Indication: For gout     anastrozole 1 mg oral tablet  -- 1 tab(s) by mouth once a day  -- Indication: For breast ca    bumetanide 1 mg oral tablet  -- 1 tab(s) by mouth once a day  -- Indication: For diuretic     docusate sodium 100 mg oral capsule  -- 1 cap(s) by mouth 3 times a day  -- Indication: For colace     Namenda 10 mg oral tablet  -- 1 tab(s) by mouth 2 times a day  -- Indication: For dementia     multivitamin  -- 1 tab(s)  once a day  -- Indication: For vit

## 2018-08-28 NOTE — DISCHARGE NOTE ADULT - CARE PROVIDER_API CALL
Markel Reddy), Cardiology; Cardiovascular Disease; Nuclear Cardiology  [address] [place] [address]  Phone: [phone]  Fax: [phone]    Barbie Ventura), Neurology  [address] [place] [id number]  Phone: [phone]  Fax: [phone]

## 2018-08-28 NOTE — DISCHARGE NOTE ADULT - PATIENT PORTAL LINK FT
You can access the [facility] Patient Portal, offered by [facility], by registering with the following website: [email or web address]

## 2018-08-29 ENCOUNTER — INBOUND DOCUMENT (OUTPATIENT)
Age: 83
End: 2018-08-29

## 2018-08-29 LAB
CULTURE RESULTS: SIGNIFICANT CHANGE UP
CULTURE RESULTS: SIGNIFICANT CHANGE UP
SPECIMEN SOURCE: SIGNIFICANT CHANGE UP
SPECIMEN SOURCE: SIGNIFICANT CHANGE UP

## 2018-09-03 DIAGNOSIS — B96.20 UNSPECIFIED ESCHERICHIA COLI [E. COLI] AS THE CAUSE OF DISEASES CLASSIFIED ELSEWHERE: ICD-10-CM

## 2018-09-03 DIAGNOSIS — F02.80 DEMENTIA IN OTHER DISEASES CLASSIFIED ELSEWHERE, UNSPECIFIED SEVERITY, WITHOUT BEHAVIORAL DISTURBANCE, PSYCHOTIC DISTURBANCE, MOOD DISTURBANCE, AND ANXIETY: ICD-10-CM

## 2018-09-03 DIAGNOSIS — G30.9 ALZHEIMER'S DISEASE, UNSPECIFIED: ICD-10-CM

## 2018-09-03 DIAGNOSIS — N39.0 URINARY TRACT INFECTION, SITE NOT SPECIFIED: ICD-10-CM

## 2018-09-03 DIAGNOSIS — E78.5 HYPERLIPIDEMIA, UNSPECIFIED: ICD-10-CM

## 2018-09-03 DIAGNOSIS — M10.9 GOUT, UNSPECIFIED: ICD-10-CM

## 2018-09-03 DIAGNOSIS — I11.0 HYPERTENSIVE HEART DISEASE WITH HEART FAILURE: ICD-10-CM

## 2018-09-03 DIAGNOSIS — I50.9 HEART FAILURE, UNSPECIFIED: ICD-10-CM

## 2018-09-03 DIAGNOSIS — R06.02 SHORTNESS OF BREATH: ICD-10-CM

## 2018-10-01 ENCOUNTER — INBOUND DOCUMENT (OUTPATIENT)
Age: 83
End: 2018-10-01

## 2019-01-01 NOTE — ED PROVIDER NOTE - UNABLE TO OBTAIN
Problem: Patient Care Overview  Goal: Plan of Care Review  Outcome: Ongoing (interventions implemented as appropriate)   01/15/19 6034   Coping/Psychosocial   Care Plan Reviewed With mother;father   Plan of Care Review   Progress improving   OTHER   Outcome Summary vss, voiding and stooling, breastfeeding, mother gave bath , hearing passed     Goal: Individualization and Mutuality  Outcome: Ongoing (interventions implemented as appropriate)    Goal: Discharge Needs Assessment  Outcome: Ongoing (interventions implemented as appropriate)    Goal: Interprofessional Rounds/Family Conf  Outcome: Ongoing (interventions implemented as appropriate)      Problem:  Infant, Late or Early Term  Goal: Signs and Symptoms of Listed Potential Problems Will be Absent, Minimized or Managed ( Infant, Late or Early Term)  Outcome: Ongoing (interventions implemented as appropriate)         Dementia

## 2019-02-12 NOTE — ED ADULT NURSE NOTE - NSSISCREENINGQ2_ED_A_ED
[Musculoskeletal - Swelling] : no joint swelling [Range of Motion to Joints] : range of motion to joints [Motor Tone] : muscle strength and tone were normal [Normal] : oriented to person, place and time, the affect was normal, the mood was normal and not anxious [Oriented To Time, Place, And Person] : oriented to person, place, and time [de-identified] : craniotmy scar right frontal, C/D/I [de-identified] : In wheelchair, but able to ambulate independently, albeit with guarded wide based gait.  [de-identified] : s/p right frontal craniotomy,  incision healing well, CDI [de-identified] : CN II- XII grossly intact. Sensation intact to LT and PP. Strength 5/5 throughout. FTN intact. No pronator drift. Gait wide based and guarded.  No

## 2021-04-30 NOTE — ED ADULT NURSE NOTE - NS ED NOTE ABUSE RESPONSE YN
Endoscopy Discharge Instructions    _x__ EGD _X_Colonoscopy  __Flexible Sigmoidoscopy  __Bronchoscopy  __Gastroscopy  __Esophageal Dilatation   __Endoscopic Ultrasound  __Bravo __ ERCP    Diet:   *Avoid alcoholic beverages for 24 hours.   * Do not eat ot drink for 2 hours after procedure if numbing spray used. You may start with sips of water      then resume normal diet.    *Unless otherwise instructed  Resume normal diet - please eat lightly at next meal.      For Pain of Discomfort:    * After an EGD, you may experience a slight sore throat which will subside.             You may find throat lozenges or gargles are helpful.     * It is not unusual to feel bloated or full of gas after these procedures. This discomfort will pass.         * Do not take any aspirin or any new medication without asking your doctor.         * If you develop a lump or redness at the site or the IV            you may apply a warm wash cloth to the area for 15-20 minutes, 2-3 times daily.     * Last dose of pain meds were given at :    Activity:    * Avoid driving for 24 hours.    * Rest today, normal activity tomorrow as tolerated     *  May return to work:     Special Instructions:         * If biopsies were taken, call your doctor's office in Seven days for the results.     * Special instructions:     Your Regular Medications:    * Resume all medications unless otherwise instructed    *  Routine aspirin-Resume on:                        (date)          * Coumadin-Resume on :    * Diabetes medications-Resume on :    * Other home medications-Resume on :      Call your physician for any of the followin.  Persistent bleeding   ** a small amount of bleeding may be noticed if biopsies were taken or polyps removed. Call Doctor  if heavy bleeding-over 2 teaspoons is noted  2.  Difficulty breathing or swallowing  3.  Fever or chills  4.  Increased confusion / severe headache  5.  Persistent nausea, vomiting and/or diarrhea  6.  Drainage  from wound with odor  7.  Severe pain, numbness, coldness, or color change in extremity  8. Abdominal pain       Unable to assess due to medical condition

## 2021-07-29 NOTE — ED ADULT NURSE NOTE - CHIEF COMPLAINT QUOTE
Pt brought from home after tripping coming out of bedroom.  Denies head injury, denies LOC.  Left leg appears shortened and internally rotated   HX: dementia, as per son pt "rarely speaks"
Paroxysmal atrial fibrillation    Prostate cancer

## 2021-12-21 NOTE — PATIENT PROFILE ADULT. - BLOOD AVOIDANCE/RESTRICTIONS, PROFILE
[facility] Infectious Disease Progress Note    Vipul Mcgovern Patient Status:  Inpatient    1968 MRN [id number]   Location [address] Attending Mj Sandoval MD   Hosp Day # 3 PCP Abiola Granger MD       ASSESSMENT:   - Sepsis  - R hydronephrosis, concern for pyleo, passed stone  - E.faecalis UTI  - Fever  - Leukocytosis  - CHIOMA  - R flank pain  - HTN      PLAN:   - Continue zosyn for now, on discharge complete 14d course with PO augmentin (EOT ) e-rx to pharmacy  - Renal,  following  - repeat ct a/p planned for today  - Monitor temps, clinical course  - Dw patient, primary service, renal service  - ok for discharge planning from ID standpoint  -  We will continue to follow with you.       Subjective:   Patient seen and examined this am. Overall feeling improved. Denies further abdominal pain. Denies dysuria. Cr decr to 2.11, WBC decr to 18.5k.     Bcx ngtd.       Past Medical History  Past Medical History:   Diagnosis Date   • Essential (primary) hypertension         Surgical History  History reviewed. No pertinent surgical history.     Social History  Social History     Tobacco Use   • Smoking status: Never Smoker   • Smokeless tobacco: Never Used   Vaping Use   • Vaping Use: never used   Substance Use Topics   • Alcohol use: Not Currently   • Drug use: Never       Family History    Family History   Problem Relation Age of Onset   • Cancer Mother    • Hypertension Mother    • Cancer, Breast Mother    • Hypertension Maternal Uncle    • Hypertension Father    • Heart disease Maternal Grandfather        Allergies:  ALLERGIES:  No Known Allergies    Medications:  Current Facility-Administered Medications   Medication Dose Route Frequency Provider Last Rate Last Admin   • piperacillin-tazobactam (ZOSYN) 3.375 g in sodium chloride 0.9 % 100 mL IVPB  3.375 g Intravenous 3 times per day Enoc Capone MD 25 mL/hr at 21 0538 3.375 g at 21 0538   • hydrALAZINE (APRESOLINE)  tablet 50 mg  50 mg Oral BID Barron Pendleton DO   50 mg at 12/21/21 0829   • tamsulosin (FLOMAX) capsule 0.4 mg  0.4 mg Oral BID Joe WALKER MD   0.4 mg at 12/21/21 0829   • heparin (porcine) injection 7,500 Units  7,500 Units Subcutaneous Q8H Reny Bowens MD   7,500 Units at 12/21/21 0830   • allopurinol (ZYLOPRIM) tablet 100 mg  100 mg Oral Daily Shazia Heard MD   100 mg at 12/21/21 0829   • metoPROLOL tartrate (LOPRESSOR) tablet 50 mg  50 mg Oral 2 times per day Shazia Heard MD   50 mg at 12/21/21 0829   • cholecalciferol (VITAMIN D) tablet 25 mcg  25 mcg Oral Daily Shazia Heard MD   25 mcg at 12/21/21 0829   • lactated ringers infusion   Intravenous Continuous Shazia Heard  mL/hr at 12/21/21 0329 New Bag at 12/21/21 0329   • simethicone (MYLICON) tablet 125 mg  125 mg Oral 4x Daily PRN Amelia Arauz MD       • hydrALAZINE (APRESOLINE) tablet 10 mg  10 mg Oral Q6H PRN Amelia Arauz MD   10 mg at 12/20/21 0055   • benzonatate (TESSALON PERLES) capsule 100 mg  100 mg Oral TID PRN Amelia Arauz MD   100 mg at 12/19/21 2259   • melatonin tablet 3 mg  3 mg Oral Nightly PRN Amelia Arauz MD       • morphine injection 2 mg  2 mg Intravenous Q4H PRN Amelia Arauz MD       • sodium chloride 0.9 % flush bag 25 mL  25 mL Intravenous PRN Amelia Arauz MD       • sodium chloride (PF) 0.9 % injection 2 mL  2 mL Intracatheter 2 times per day Amelia Arauz MD   2 mL at 12/21/21 0830   • Potassium Standard Replacement Protocol   Does not apply See Admin Instructions Amelia Arauz MD       • Magnesium Standard Replacement Protocol   Does not apply See Admin Instructions Amelia Arauz MD       • Phosphorus Standard Replacement Protocol   Does not apply See Admin Instructions Amelia Arauz MD       • ondansetron (ZOFRAN) injection 4 mg  4 mg Intravenous BID PRN Amelia Arauz MD       • acetaminophen (TYLENOL) tablet 650 mg  650 mg Oral Q4H PRN Amelia Arauz MD    650 mg at 21   • HYDROcodone-acetaminophen (NORCO) 5-325 MG per tablet 1 tablet  1 tablet Oral Q4H PRN Amelia Arauz MD       • polyethylene glycol (MIRALAX) packet 17 g  17 g Oral Daily PRN Amelia Arauz MD       • docusate sodium-sennosides (SENOKOT S) 50-8.6 MG 2 tablet  2 tablet Oral Daily PRN Amelia Arauz MD       • bisacodyl (DULCOLAX) suppository 10 mg  10 mg Rectal Daily PRN Amelia Arauz MD       • magnesium hydroxide (MILK OF MAGNESIA) 400 MG/5ML suspension 30 mL  30 mL Oral Daily PRN Amelia Arauz MD       • sodium chloride 0.9 % flush bag 25 mL  25 mL Intravenous PRN Amelia Arauz MD       • labetalol (NORMODYNE) injection 10 mg  10 mg Intravenous Q6H PRN Amelia Arauz MD            Review of Systems  General Constitutional:  No weight loss, night sweats    Neck:  No neck pain, stiffness   HEENT:  No report of nasal congestion, postnasal drip, ear pain or sore throat   Cardiovascular:  No complaint of any chest pains or palpitations   Lungs:  No report of any shortness of breath or cough   GI:  Denies abdominal pain, nausea, vomiting or diarrhea   : Denies dysuria or hematuria   Musculoskeletal: No report of any joint pain, swelling    Skin: No rashes or ulcerations reported    Neuro:  No report of any motor or sensory deficit, no headaches          Physical Exam:   Weight:   Wt Readings from Last 3 Encounters:   21 (!) 155.3 kg (342 lb 6 oz)   10/06/21 (!) 154.2 kg (340 lb)   21 (!) 153.6 kg (338 lb 9.6 oz)   , Weight change:   Height:   Ht Readings from Last 3 Encounters:   21 5' 5\" (1.651 m)   10/06/21 5' 5\" (1.651 m)   21 5' 5\" (1.651 m)      Tmax Temp (24hrs), Av.6 °F (37 °C), Min:97.7 °F (36.5 °C), Max:99.9 °F (37.7 °C)     Last Vitals   Vitals:    21 0828   BP: (!) 160/77   Pulse: 87   Resp:    Temp: 98.4 °F (36.9 °C)     General Appearance:  NAD,  Awake, alert, NAD   HEENT:  Neck: No scleral icterus, no thrush  Supple, NT, no LN    Lungs:   Clear, no rhonchi, no wheezing   Heart : RRR, s1 and s2   Abdomen:  :   Soft, nt, nd + bs, no r/g  No SPC tenderness, no rosales   Extremities: No edema, non tender   Neurologic: No focal deficits, a&o x 3   Skin: No rash, warm, dry     Results:  Labs:   Recent Labs     12/19/21  0649 12/20/21  0434 12/21/21  0525   WBC 17.2* 19.5* 18.5*   HGB 11.4* 10.5* 10.8*   HCT 34.4* 32.2* 33.2*    187 273   MCV 89.8 93.1 93.5       Recent Labs   Lab 12/21/21  0525 12/20/21  1209 12/20/21  0434 12/20/21  0434 12/19/21  1508 12/19/21  1508   SODIUM 138  --   --  134*  --  136   POTASSIUM 3.5 3.3*  --  3.4   < > 3.7   CHLORIDE 104  --   --  103  --  104   CO2 23  --   --  23  --  25   BUN 29*  --   --  34*  --  35*   CREATININE 2.11*  --   --  2.31*  --  2.62*   GLUCOSE 91  --    < > 89   < > 96   CALCIUM 8.0*  --   --  7.8*  --  7.9*    < > = values in this interval not displayed.       Recent Labs   Lab 12/20/21  0434 12/19/21  0649 12/18/21  0848 12/17/21  0956   AST 31 30 27 37       Imaging:   CT A/P  Impression:       A 3 mm stone in the bladder just distal to the right UVJ is noted. Mild to   moderate right hydronephrosis and hydroureter with adjacent stranding.     A 1.5 cm left adrenal lesion favored for a lipid rich adenoma.      ABD us  Impression:       1.  No ultrasound evidence of shadowing gallstones or any evidence of acute   cholecystitis.     2.  No biliary ductal dilatation.     3. Right kidney measures 12.4 cm in bipolar length.  9mm echogenic focus in   the right kidney may represent a stone.  Borderline right hydronephrosis.   Correlate with CT scan.          SAVI Justin [facility], [facility]  [phone]  12/21/2021  12:25 PM   Unable to assess patient minimally verbal

## 2022-07-18 NOTE — PATIENT PROFILE ADULT. - PAIN SCALE PREFERRED, PROFILE
FLACC Keystone Flap Text: The defect edges were debeveled with a #15 scalpel blade.  Given the location of the defect, shape of the defect a keystone flap was deemed most appropriate.  Using a sterile surgical marker, an appropriate keystone flap was drawn incorporating the defect, outlining the appropriate donor tissue and placing the expected incisions within the relaxed skin tension lines where possible. The area thus outlined was incised deep to adipose tissue with a #15 scalpel blade.  The skin margins were undermined to an appropriate distance in all directions around the primary defect and laterally outward around the flap utilizing iris scissors.

## 2023-03-08 NOTE — DISCHARGE NOTE ADULT - MEDICATION SUMMARY - MEDICATIONS TO CHANGE
I will SWITCH the dose or number of times a day I take the medications listed below when I get home from the hospital:  None Oral Minoxidil Counseling- I discussed with the patient the risks of oral minoxidil including but not limited to shortness of breath, swelling of the feet or ankles, dizziness, lightheadedness, unwanted hair growth and allergic reaction.  The patient verbalized understanding of the proper use and possible adverse effects of oral minoxidil.  All of the patient's questions and concerns were addressed.

## 2023-07-10 NOTE — H&P ADULT. - OPHTHALMOLOGIC
Problem: Hyperinflation  Goal: Prevent or improve atelectasis  Description: Target End Date:  3 to 4 days    1. Instruct incentive spirometry usage  2.  Perform hyperinflation therapy as indicated  Outcome: Progressing  PEP BID    is 2000      negative

## 2023-08-21 NOTE — H&P ADULT - NSHPPOASURGSITEINCISION_GEN_ALL_CORE
Detail Level: Detailed Quality 130: Documentation Of Current Medications In The Medical Record: Current Medications Documented Quality 431: Preventive Care And Screening: Unhealthy Alcohol Use - Screening: Patient not identified as an unhealthy alcohol user when screened for unhealthy alcohol use using a systematic screening method Quality 226: Preventive Care And Screening: Tobacco Use: Screening And Cessation Intervention: Patient screened for tobacco use and is an ex/non-smoker no

## 2024-04-15 NOTE — PROGRESS NOTE ADULT - PROBLEM SELECTOR PROBLEM 2
0 (no pain/absence of nonverbal indicators of pain)
Urinary tract infection without hematuria, site unspecified

## 2024-09-06 NOTE — DISCHARGE NOTE ADULT - DISCHARGE DATE
Anesthesia Start and Stop Event Times       Date Time Event    9/6/2024 1008 Ready for Procedure     1016 Anesthesia Start     1117 Anesthesia Stop          Responsible Staff  09/06/24      Name Role Begin End    Jarvis Drew M.D. Anesth 1016 1117          Overtime Reason:  no overtime (within assigned shift)    Comments:                                                           28-Aug-2018

## 2025-02-01 NOTE — PATIENT PROFILE ADULT. - AS SC BRADEN MOBILITY
(3) slightly limited You can access the FollowMyHealth Patient Portal offered by [facility] by registering at the following website: [email or web address]. By joining [employer]’s FollowMyHealth portal, you will also be able to view your health information using other applications (apps) compatible with our system.

## 2025-02-19 NOTE — H&P ADULT. - PROBLEM/PLAN-10
Assessment/Plan   Problem List Items Addressed This Visit       Paralytic lagophthalmos - Primary     Stable lagophthalmos and still placing at risk for exposure. Despite good control of symptoms with ointment lubrication, would like to ask for specialist opinion. Will refer to oculoplastics.          Exposure keratopathy     Stable and no active keratopathy. Advised could trial lubricants frequently instead of ointment during the day, should continue ointment at bedtime.             Provided reassurance regarding above diagnoses and care received in the office visit today. Discussed outcomes and options along with the importance of treatment compliance. Understands the importance of any follow up visits. Patient instructed to call/communicate with our office if any new issues, questions, or concerns.     Will plan to see back in 4 months for surface check or sooner PRN      
DISPLAY PLAN FREE TEXT